# Patient Record
Sex: FEMALE | Race: OTHER | HISPANIC OR LATINO | URBAN - METROPOLITAN AREA
[De-identification: names, ages, dates, MRNs, and addresses within clinical notes are randomized per-mention and may not be internally consistent; named-entity substitution may affect disease eponyms.]

---

## 2023-07-25 ENCOUNTER — EMERGENCY (EMERGENCY)
Facility: HOSPITAL | Age: 73
LOS: 1 days | Discharge: HOME | End: 2023-07-25
Attending: EMERGENCY MEDICINE
Payer: MEDICARE

## 2023-07-25 VITALS
TEMPERATURE: 99 F | OXYGEN SATURATION: 99 % | HEART RATE: 88 BPM | DIASTOLIC BLOOD PRESSURE: 84 MMHG | RESPIRATION RATE: 17 BRPM | WEIGHT: 143.3 LBS | SYSTOLIC BLOOD PRESSURE: 144 MMHG

## 2023-07-25 DIAGNOSIS — Z90.49 ACQUIRED ABSENCE OF OTHER SPECIFIED PARTS OF DIGESTIVE TRACT: Chronic | ICD-10-CM

## 2023-07-25 LAB
ALBUMIN SERPL ELPH-MCNC: 3.6 G/DL — SIGNIFICANT CHANGE UP (ref 3.5–5)
ALP SERPL-CCNC: 101 U/L — SIGNIFICANT CHANGE UP (ref 40–120)
ALT FLD-CCNC: 28 U/L DA — SIGNIFICANT CHANGE UP (ref 10–60)
ANION GAP SERPL CALC-SCNC: 6 MMOL/L — SIGNIFICANT CHANGE UP (ref 5–17)
APPEARANCE UR: ABNORMAL
AST SERPL-CCNC: 18 U/L — SIGNIFICANT CHANGE UP (ref 10–40)
BACTERIA # UR AUTO: ABNORMAL /HPF
BASOPHILS # BLD AUTO: 0.05 K/UL — SIGNIFICANT CHANGE UP (ref 0–0.2)
BASOPHILS NFR BLD AUTO: 0.3 % — SIGNIFICANT CHANGE UP (ref 0–2)
BILIRUB SERPL-MCNC: 0.6 MG/DL — SIGNIFICANT CHANGE UP (ref 0.2–1.2)
BILIRUB UR-MCNC: NEGATIVE — SIGNIFICANT CHANGE UP
BUN SERPL-MCNC: 17 MG/DL — SIGNIFICANT CHANGE UP (ref 7–18)
CALCIUM SERPL-MCNC: 9 MG/DL — SIGNIFICANT CHANGE UP (ref 8.4–10.5)
CHLORIDE SERPL-SCNC: 106 MMOL/L — SIGNIFICANT CHANGE UP (ref 96–108)
CO2 SERPL-SCNC: 26 MMOL/L — SIGNIFICANT CHANGE UP (ref 22–31)
COLOR SPEC: SIGNIFICANT CHANGE UP
CREAT SERPL-MCNC: 1.03 MG/DL — SIGNIFICANT CHANGE UP (ref 0.5–1.3)
DIFF PNL FLD: ABNORMAL
EGFR: 58 ML/MIN/1.73M2 — LOW
EOSINOPHIL # BLD AUTO: 0.02 K/UL — SIGNIFICANT CHANGE UP (ref 0–0.5)
EOSINOPHIL NFR BLD AUTO: 0.1 % — SIGNIFICANT CHANGE UP (ref 0–6)
EPI CELLS # UR: ABNORMAL /HPF
GLUCOSE SERPL-MCNC: 119 MG/DL — HIGH (ref 70–99)
GLUCOSE UR QL: NEGATIVE — SIGNIFICANT CHANGE UP
HCT VFR BLD CALC: 43 % — SIGNIFICANT CHANGE UP (ref 34.5–45)
HGB BLD-MCNC: 14.2 G/DL — SIGNIFICANT CHANGE UP (ref 11.5–15.5)
HIV 1 & 2 AB SERPL IA.RAPID: SIGNIFICANT CHANGE UP
IMM GRANULOCYTES NFR BLD AUTO: 0.9 % — SIGNIFICANT CHANGE UP (ref 0–0.9)
KETONES UR-MCNC: NEGATIVE — SIGNIFICANT CHANGE UP
LACTATE SERPL-SCNC: 1.5 MMOL/L — SIGNIFICANT CHANGE UP (ref 0.7–2)
LACTATE SERPL-SCNC: 2.1 MMOL/L — HIGH (ref 0.7–2)
LEUKOCYTE ESTERASE UR-ACNC: ABNORMAL
LIDOCAIN IGE QN: 80 U/L — SIGNIFICANT CHANGE UP (ref 73–393)
LYMPHOCYTES # BLD AUTO: 0.8 K/UL — LOW (ref 1–3.3)
LYMPHOCYTES # BLD AUTO: 4.5 % — LOW (ref 13–44)
MAGNESIUM SERPL-MCNC: 2 MG/DL — SIGNIFICANT CHANGE UP (ref 1.6–2.6)
MCHC RBC-ENTMCNC: 30.6 PG — SIGNIFICANT CHANGE UP (ref 27–34)
MCHC RBC-ENTMCNC: 33 GM/DL — SIGNIFICANT CHANGE UP (ref 32–36)
MCV RBC AUTO: 92.7 FL — SIGNIFICANT CHANGE UP (ref 80–100)
MONOCYTES # BLD AUTO: 1.06 K/UL — HIGH (ref 0–0.9)
MONOCYTES NFR BLD AUTO: 5.9 % — SIGNIFICANT CHANGE UP (ref 2–14)
NEUTROPHILS # BLD AUTO: 15.78 K/UL — HIGH (ref 1.8–7.4)
NEUTROPHILS NFR BLD AUTO: 88.3 % — HIGH (ref 43–77)
NITRITE UR-MCNC: NEGATIVE — SIGNIFICANT CHANGE UP
NRBC # BLD: 0 /100 WBCS — SIGNIFICANT CHANGE UP (ref 0–0)
NT-PROBNP SERPL-SCNC: 210 PG/ML — HIGH (ref 0–125)
PH UR: 6 — SIGNIFICANT CHANGE UP (ref 5–8)
PLATELET # BLD AUTO: 183 K/UL — SIGNIFICANT CHANGE UP (ref 150–400)
POTASSIUM SERPL-MCNC: 4.1 MMOL/L — SIGNIFICANT CHANGE UP (ref 3.5–5.3)
POTASSIUM SERPL-SCNC: 4.1 MMOL/L — SIGNIFICANT CHANGE UP (ref 3.5–5.3)
PROT SERPL-MCNC: 8.3 G/DL — SIGNIFICANT CHANGE UP (ref 6–8.3)
PROT UR-MCNC: 100 MG/DL
RBC # BLD: 4.64 M/UL — SIGNIFICANT CHANGE UP (ref 3.8–5.2)
RBC # FLD: 13.1 % — SIGNIFICANT CHANGE UP (ref 10.3–14.5)
RBC CASTS # UR COMP ASSIST: ABNORMAL /HPF (ref 0–2)
SODIUM SERPL-SCNC: 138 MMOL/L — SIGNIFICANT CHANGE UP (ref 135–145)
SP GR SPEC: 1.01 — SIGNIFICANT CHANGE UP (ref 1.01–1.02)
TROPONIN I, HIGH SENSITIVITY RESULT: 8.6 NG/L — SIGNIFICANT CHANGE UP
UROBILINOGEN FLD QL: NEGATIVE — SIGNIFICANT CHANGE UP
WBC # BLD: 17.87 K/UL — HIGH (ref 3.8–10.5)
WBC # FLD AUTO: 17.87 K/UL — HIGH (ref 3.8–10.5)
WBC UR QL: >50 /HPF (ref 0–5)

## 2023-07-25 PROCEDURE — 74176 CT ABD & PELVIS W/O CONTRAST: CPT | Mod: 26,MA

## 2023-07-25 PROCEDURE — 99285 EMERGENCY DEPT VISIT HI MDM: CPT

## 2023-07-25 PROCEDURE — 71045 X-RAY EXAM CHEST 1 VIEW: CPT | Mod: 26

## 2023-07-25 RX ORDER — SODIUM CHLORIDE 9 MG/ML
1000 INJECTION INTRAMUSCULAR; INTRAVENOUS; SUBCUTANEOUS ONCE
Refills: 0 | Status: COMPLETED | OUTPATIENT
Start: 2023-07-25 | End: 2023-07-25

## 2023-07-25 RX ORDER — CEFUROXIME AXETIL 250 MG
1 TABLET ORAL
Qty: 20 | Refills: 0
Start: 2023-07-25 | End: 2023-08-03

## 2023-07-25 RX ORDER — IBUPROFEN 200 MG
1 TABLET ORAL
Qty: 15 | Refills: 0
Start: 2023-07-25 | End: 2023-07-29

## 2023-07-25 RX ORDER — SODIUM CHLORIDE 9 MG/ML
1000 INJECTION INTRAMUSCULAR; INTRAVENOUS; SUBCUTANEOUS
Refills: 0 | Status: DISCONTINUED | OUTPATIENT
Start: 2023-07-25 | End: 2023-07-29

## 2023-07-25 RX ORDER — CEFTRIAXONE 500 MG/1
1000 INJECTION, POWDER, FOR SOLUTION INTRAMUSCULAR; INTRAVENOUS ONCE
Refills: 0 | Status: COMPLETED | OUTPATIENT
Start: 2023-07-25 | End: 2023-07-25

## 2023-07-25 RX ORDER — KETOROLAC TROMETHAMINE 30 MG/ML
30 SYRINGE (ML) INJECTION ONCE
Refills: 0 | Status: DISCONTINUED | OUTPATIENT
Start: 2023-07-25 | End: 2023-07-25

## 2023-07-25 RX ADMIN — SODIUM CHLORIDE 1000 MILLILITER(S): 9 INJECTION INTRAMUSCULAR; INTRAVENOUS; SUBCUTANEOUS at 18:50

## 2023-07-25 RX ADMIN — Medication 30 MILLIGRAM(S): at 22:12

## 2023-07-25 RX ADMIN — SODIUM CHLORIDE 150 MILLILITER(S): 9 INJECTION INTRAMUSCULAR; INTRAVENOUS; SUBCUTANEOUS at 18:56

## 2023-07-25 RX ADMIN — CEFTRIAXONE 100 MILLIGRAM(S): 500 INJECTION, POWDER, FOR SOLUTION INTRAMUSCULAR; INTRAVENOUS at 18:56

## 2023-07-25 RX ADMIN — Medication 30 MILLIGRAM(S): at 22:35

## 2023-07-25 RX ADMIN — CEFTRIAXONE 1000 MILLIGRAM(S): 500 INJECTION, POWDER, FOR SOLUTION INTRAMUSCULAR; INTRAVENOUS at 19:18

## 2023-07-25 RX ADMIN — SODIUM CHLORIDE 1000 MILLILITER(S): 9 INJECTION INTRAMUSCULAR; INTRAVENOUS; SUBCUTANEOUS at 19:18

## 2023-07-25 NOTE — ED PROVIDER NOTE - NSFOLLOWUPINSTRUCTIONS_ED_ALL_ED_FT
Pielonefritis en los adultos  Pyelonephritis, Adult  Body outline showing the urinary system, with a close-up of a normal kidney and an infected kidney.  La pielonefritis es america infección que se produce en el riñón. Los riñones son los órganos que filtran la benjamin y eliminan los residuos del torrente sanguíneo a través de la orina. La orina pasa desde los riñones, a través de tubos llamados uréteres, hacia la vejiga. Hay dos tipos principales de pielonefritis:  Infecciones que se inician rápidamente sin síntomas previos (pielonefritis aguda).  Infecciones que persisten main un período prolongado (pielonefritis crónica).  En la mayoría de los casos, la infección desaparece con el tratamiento y no causa otros problemas. Las infecciones más graves o crónicas a veces pueden propagarse al torrente sanguíneo u ocasionar otros problemas en los riñones.    ¿Cuáles son las causas?  Por lo general, entre las causas de esta afección, se incluyen las siguientes:  Bacterias que viajan desde la vejiga al riñón. [place] puede ocurrir después de katharina tenido america infección en la vejiga (cistitis) o america infección urinaria (IU).  Infecciones de la vejiga causadas por bacterias que viajan desde el torrente sanguíneo hasta el riñón.  ¿Qué incrementa el riesgo?  Es más probable que esta afección se manifieste en:  Mujeres embarazadas.  Personas de edad avanzada.  Personas que tienen alguna de estas afecciones:  Diabetes.  Inflamación de la próstata (prostatitis) en los hombres.  Cálculos renales o en la vejiga.  Otras anormalidades del riñón o de la uretra.  Cáncer.  Las personas que tienen america sonda vesical.  Las personas que son sexualmente activas.  Las mujeres que usan espermicidas.  Las personas que tuvieron america IU previa.  ¿Cuáles son los signos o los síntomas?  Los síntomas de esta afección incluyen:  Ganas frecuentes de orinar.  Necesidad intensa o persistente de orinar.  Sensación de ardor o escozor al orinar.  Dolor abdominal.  Dolor de espalda.  Dolor al costado del cuerpo o en la fosa lumbar.  Fiebre o escalofríos.  Benjamin en la orina u orina de color oscuro.  Náuseas o vómitos.  ¿Cómo se diagnostica?  Esta afección se puede diagnosticar en función de lo siguiente:  Los antecedentes médicos y un examen físico.  Análisis de orina.  Análisis de benjamin.  También pueden hacerle estudios de diagnóstico por imágenes de los riñones, por ejemplo, america ecografía o america exploración por tomografía computarizada (TC).    ¿Cómo se trata?  El tratamiento de esta afección puede depender de la gravedad de la infección.  Si la infección es leve y se detecta rápidamente, pueden administrarle antibióticos por boca (vía oral). Deberá maximo líquido para permanecer hidratado.  Si la infección es más grave, es posible que deban hospitalizarlo para administrarle antibióticos directamente en america vena a través de america vía intravenosa (IV). Quizás también deban administrarle líquidos a través de america vía intravenosa si no puede permanecer gideon hidratado. Después de la hospitalización, es posible que deba maximo antibióticos main un tiempo.  Podrán prescribirle otros tratamientos según la causa de la infección.    Siga estas instrucciones en corona casa:  Medicamentos    [place] corona antibiótico grace se lo haya indicado el médico. No deje de maximo el antibiótico aunque comience a sentirse mejor.  Use los medicamentos de venta sterling y los recetados solamente grace se lo haya indicado el médico.  Instrucciones generales    Three cups showing dark yellow, yellow, and pale yellow urine.  Sugey suficiente líquido grace para mantener la orina de color amarillo pálido.  Evite la cafeína, el té y las bebidas gaseosas. Estas sustancias irritan la vejiga.  Orine con frecuencia. Evite retener la orina main largos períodos.  Orine antes y después de las relaciones sexuales.  Después de las deposiciones, las mujeres deben higienizarse desde adelante hacia atrás. Use cada papel solamente america vez.  Concurra a todas las visitas de seguimiento grace se lo haya indicado el médico. [place] es importante.  Comuníquese con un médico si:  Los síntomas no mejoran después de 2 hermann de tratamiento.  Martha síntomas empeoran.  Tiene fiebre.  Solicite ayuda de inmediato si:  No puede maximo los antibióticos ni ingerir líquidos.  Tiene escalofríos.  Vomita.  Siente un dolor intenso en la espalda o en la fosa lumbar.  Se desmaya o siente america debilidad extrema.  Resumen  La pielonefritis es america infección urinaria (IU) que se produce en el riñón.  El tratamiento de esta afección puede depender de la gravedad de la infección.  [place] corona antibiótico grace se lo haya indicado el médico. No deje de maximo el antibiótico aunque comience a sentirse mejor.  Sugey suficiente líquido grace para mantener la orina de color amarillo pálido.  Concurra a todas las visitas de seguimiento grace se lo haya indicado el médico. [place] es importante.  Esta información no tiene grace fin reemplazar el consejo del médico. Asegúrese de hacerle al médico cualquier pregunta que tenga.      drink plenty of fluids  take Probiotics 3 hours after taking antibiotics  if no improvement, return to ED  follow up with your medical doctor

## 2023-07-25 NOTE — ED PROVIDER NOTE - CCCP TRG CHIEF CMPLNT
PROCEDURES:  Left total knee arthroplasty 06-Jun-2022 18:26:30  Kishore Candelaria   fever with chills/flank pain

## 2023-07-25 NOTE — ED PROVIDER NOTE - PRO INTERPRETER NEED 2
From: Bandar Israel  To: Eileen Pendleton  Sent: 4/13/2023 10:20 AM CDT  Subject: Doctors note    Hi, I was wondering if you could give me a doctors note that excuses me for the entire day today and return to work tomorrow.     Thanks Bandar    [place]

## 2023-07-25 NOTE — ED PROVIDER NOTE - CLINICAL SUMMARY MEDICAL DECISION MAKING FREE TEXT BOX
pt with urinary symptoms, concern for pyelo., unlikely renal colic, will get labs., CT, give Abx, IVF, reassess

## 2023-07-25 NOTE — ED PROVIDER NOTE - OBJECTIVE STATEMENT
72 y.o. female c/o on & off Rt flank pain for past 5 days, dysuria, urinary frequency, fever, rigors, nausea, no vomiting, Pt took tylenol 1 tab ~4pm. Pt feeling a little hungry.  Last BM this am, no BRBPR

## 2023-07-25 NOTE — ED PROVIDER NOTE - PROGRESS NOTE DETAILS
Labs/CT explained to pt &   Pt with acute pyelo., elevated lactate, leukocytosis.  Pt states feeling much better, wants to go home.  Will repeat lactate & CBC repeat lactate 1.5, pt's feeling better, still wants to go home.   Will d/c home with ceftin.  advised if no improvement, to return to ED, also to f/u with PMD

## 2023-07-25 NOTE — ED PROVIDER NOTE - NEUROLOGICAL, MLM
What Type Of Note Output Would You Prefer (Optional)?: Bullet Format
Hpi Title: Evaluation of Skin Lesions
Additional History: New. FBSE. Spot on breast that’s growing. Hx of dn
Alert and oriented, no focal deficits, no motor or sensory deficits.

## 2023-07-25 NOTE — ED ADULT NURSE NOTE - OBJECTIVE STATEMENT
Pt presents to the ED complaining of bilateral flank pain, burning with urinating, fever and chills. Streaming as normal. Denies blood in urine. Pt A&O x 3 in no acute distress. Labs drawn. Awaiting imaging.

## 2023-07-25 NOTE — ED PROVIDER NOTE - PATIENT PORTAL LINK FT
You can access the FollowMyHealth Patient Portal offered by [facility] by registering at the following website: [email or web address]. By joining [employer]’s FollowMyHealth portal, you will also be able to view your health information using other applications (apps) compatible with our system.

## 2023-07-25 NOTE — ED ADULT NURSE NOTE - NSFALLUNIVINTERV_ED_ALL_ED
Bed/Stretcher in lowest position, wheels locked, appropriate side rails in place/Call bell, personal items and telephone in reach/Instruct patient to call for assistance before getting out of bed/chair/stretcher/Non-slip footwear applied when patient is off stretcher/[place] to call system/Physically safe environment - no spills, clutter or unnecessary equipment/Purposeful proactive rounding/Room/bathroom lighting operational, light cord in reach

## 2023-07-26 VITALS
TEMPERATURE: 98 F | RESPIRATION RATE: 18 BRPM | DIASTOLIC BLOOD PRESSURE: 76 MMHG | OXYGEN SATURATION: 96 % | HEART RATE: 60 BPM | SYSTOLIC BLOOD PRESSURE: 127 MMHG

## 2023-07-26 LAB
E COLI DNA BLD POS QL NAA+NON-PROBE: SIGNIFICANT CHANGE UP
GRAM STN FLD: SIGNIFICANT CHANGE UP
GRAM STN FLD: SIGNIFICANT CHANGE UP
METHOD TYPE: SIGNIFICANT CHANGE UP
SPECIMEN SOURCE: SIGNIFICANT CHANGE UP
SPECIMEN SOURCE: SIGNIFICANT CHANGE UP

## 2023-07-26 NOTE — ED POST DISCHARGE NOTE - DETAILS
Called listed phone number and left message for call back.  Called phone number for emergency contact and told wrong number. Called listed phone number and left message for call back. Called listed phone number and left message for call back.  Three calls attempted day of culture result. Will return to hospital for further care

## 2023-07-27 ENCOUNTER — INPATIENT (INPATIENT)
Facility: HOSPITAL | Age: 73
LOS: 2 days | Discharge: HOME | DRG: 872 | End: 2023-07-30
Attending: INTERNAL MEDICINE | Admitting: INTERNAL MEDICINE
Payer: MEDICARE

## 2023-07-27 VITALS
HEART RATE: 71 BPM | DIASTOLIC BLOOD PRESSURE: 84 MMHG | OXYGEN SATURATION: 96 % | SYSTOLIC BLOOD PRESSURE: 127 MMHG | RESPIRATION RATE: 16 BRPM | WEIGHT: 134.92 LBS | TEMPERATURE: 98 F

## 2023-07-27 DIAGNOSIS — N12 TUBULO-INTERSTITIAL NEPHRITIS, NOT SPECIFIED AS ACUTE OR CHRONIC: ICD-10-CM

## 2023-07-27 DIAGNOSIS — R78.81 BACTEREMIA: ICD-10-CM

## 2023-07-27 DIAGNOSIS — I10 ESSENTIAL (PRIMARY) HYPERTENSION: ICD-10-CM

## 2023-07-27 DIAGNOSIS — A41.9 SEPSIS, UNSPECIFIED ORGANISM: ICD-10-CM

## 2023-07-27 DIAGNOSIS — E03.9 HYPOTHYROIDISM, UNSPECIFIED: ICD-10-CM

## 2023-07-27 DIAGNOSIS — Z90.49 ACQUIRED ABSENCE OF OTHER SPECIFIED PARTS OF DIGESTIVE TRACT: Chronic | ICD-10-CM

## 2023-07-27 DIAGNOSIS — Z29.9 ENCOUNTER FOR PROPHYLACTIC MEASURES, UNSPECIFIED: ICD-10-CM

## 2023-07-27 LAB
ALBUMIN SERPL ELPH-MCNC: 3.2 G/DL — LOW (ref 3.5–5)
ALP SERPL-CCNC: 88 U/L — SIGNIFICANT CHANGE UP (ref 40–120)
ALT FLD-CCNC: 34 U/L DA — SIGNIFICANT CHANGE UP (ref 10–60)
ANION GAP SERPL CALC-SCNC: 6 MMOL/L — SIGNIFICANT CHANGE UP (ref 5–17)
APPEARANCE UR: CLEAR — SIGNIFICANT CHANGE UP
AST SERPL-CCNC: 20 U/L — SIGNIFICANT CHANGE UP (ref 10–40)
BASOPHILS # BLD AUTO: 0.01 K/UL — SIGNIFICANT CHANGE UP (ref 0–0.2)
BASOPHILS NFR BLD AUTO: 0.1 % — SIGNIFICANT CHANGE UP (ref 0–2)
BILIRUB SERPL-MCNC: 0.3 MG/DL — SIGNIFICANT CHANGE UP (ref 0.2–1.2)
BILIRUB UR-MCNC: NEGATIVE — SIGNIFICANT CHANGE UP
BUN SERPL-MCNC: 11 MG/DL — SIGNIFICANT CHANGE UP (ref 7–18)
CALCIUM SERPL-MCNC: 9.1 MG/DL — SIGNIFICANT CHANGE UP (ref 8.4–10.5)
CHLORIDE SERPL-SCNC: 109 MMOL/L — HIGH (ref 96–108)
CO2 SERPL-SCNC: 26 MMOL/L — SIGNIFICANT CHANGE UP (ref 22–31)
COLOR SPEC: YELLOW — SIGNIFICANT CHANGE UP
CREAT SERPL-MCNC: 0.88 MG/DL — SIGNIFICANT CHANGE UP (ref 0.5–1.3)
DIFF PNL FLD: ABNORMAL
EGFR: 70 ML/MIN/1.73M2 — SIGNIFICANT CHANGE UP
EOSINOPHIL # BLD AUTO: 0.04 K/UL — SIGNIFICANT CHANGE UP (ref 0–0.5)
EOSINOPHIL NFR BLD AUTO: 0.5 % — SIGNIFICANT CHANGE UP (ref 0–6)
GLUCOSE SERPL-MCNC: 100 MG/DL — HIGH (ref 70–99)
GLUCOSE UR QL: NEGATIVE — SIGNIFICANT CHANGE UP
GRAM STN FLD: SIGNIFICANT CHANGE UP
HCT VFR BLD CALC: 39.5 % — SIGNIFICANT CHANGE UP (ref 34.5–45)
HGB BLD-MCNC: 13 G/DL — SIGNIFICANT CHANGE UP (ref 11.5–15.5)
IMM GRANULOCYTES NFR BLD AUTO: 0.5 % — SIGNIFICANT CHANGE UP (ref 0–0.9)
KETONES UR-MCNC: ABNORMAL
LACTATE SERPL-SCNC: 1.1 MMOL/L — SIGNIFICANT CHANGE UP (ref 0.7–2)
LEUKOCYTE ESTERASE UR-ACNC: ABNORMAL
LYMPHOCYTES # BLD AUTO: 0.91 K/UL — LOW (ref 1–3.3)
LYMPHOCYTES # BLD AUTO: 12.1 % — LOW (ref 13–44)
MCHC RBC-ENTMCNC: 30 PG — SIGNIFICANT CHANGE UP (ref 27–34)
MCHC RBC-ENTMCNC: 32.9 GM/DL — SIGNIFICANT CHANGE UP (ref 32–36)
MCV RBC AUTO: 91.2 FL — SIGNIFICANT CHANGE UP (ref 80–100)
MONOCYTES # BLD AUTO: 0.58 K/UL — SIGNIFICANT CHANGE UP (ref 0–0.9)
MONOCYTES NFR BLD AUTO: 7.7 % — SIGNIFICANT CHANGE UP (ref 2–14)
NEUTROPHILS # BLD AUTO: 5.92 K/UL — SIGNIFICANT CHANGE UP (ref 1.8–7.4)
NEUTROPHILS NFR BLD AUTO: 79.1 % — HIGH (ref 43–77)
NITRITE UR-MCNC: NEGATIVE — SIGNIFICANT CHANGE UP
NRBC # BLD: 0 /100 WBCS — SIGNIFICANT CHANGE UP (ref 0–0)
PH UR: 6.5 — SIGNIFICANT CHANGE UP (ref 5–8)
PLATELET # BLD AUTO: 172 K/UL — SIGNIFICANT CHANGE UP (ref 150–400)
POTASSIUM SERPL-MCNC: 4.3 MMOL/L — SIGNIFICANT CHANGE UP (ref 3.5–5.3)
POTASSIUM SERPL-SCNC: 4.3 MMOL/L — SIGNIFICANT CHANGE UP (ref 3.5–5.3)
PROT SERPL-MCNC: 7.5 G/DL — SIGNIFICANT CHANGE UP (ref 6–8.3)
PROT UR-MCNC: 30 MG/DL
RBC # BLD: 4.33 M/UL — SIGNIFICANT CHANGE UP (ref 3.8–5.2)
RBC # FLD: 13.2 % — SIGNIFICANT CHANGE UP (ref 10.3–14.5)
SODIUM SERPL-SCNC: 141 MMOL/L — SIGNIFICANT CHANGE UP (ref 135–145)
SP GR SPEC: 1.01 — SIGNIFICANT CHANGE UP (ref 1.01–1.02)
UROBILINOGEN FLD QL: NEGATIVE — SIGNIFICANT CHANGE UP
WBC # BLD: 7.5 K/UL — SIGNIFICANT CHANGE UP (ref 3.8–10.5)
WBC # FLD AUTO: 7.5 K/UL — SIGNIFICANT CHANGE UP (ref 3.8–10.5)

## 2023-07-27 PROCEDURE — 99285 EMERGENCY DEPT VISIT HI MDM: CPT

## 2023-07-27 RX ORDER — ACETAMINOPHEN 500 MG
650 TABLET ORAL EVERY 6 HOURS
Refills: 0 | Status: DISCONTINUED | OUTPATIENT
Start: 2023-07-27 | End: 2023-07-30

## 2023-07-27 RX ORDER — LEVOTHYROXINE SODIUM 125 MCG
75 TABLET ORAL DAILY
Refills: 0 | Status: DISCONTINUED | OUTPATIENT
Start: 2023-07-27 | End: 2023-07-30

## 2023-07-27 RX ORDER — LEVOTHYROXINE SODIUM 125 MCG
1 TABLET ORAL
Refills: 0 | DISCHARGE

## 2023-07-27 RX ORDER — CEFTRIAXONE 500 MG/1
1000 INJECTION, POWDER, FOR SOLUTION INTRAMUSCULAR; INTRAVENOUS ONCE
Refills: 0 | Status: COMPLETED | OUTPATIENT
Start: 2023-07-27 | End: 2023-07-27

## 2023-07-27 RX ORDER — ENOXAPARIN SODIUM 100 MG/ML
40 INJECTION SUBCUTANEOUS EVERY 24 HOURS
Refills: 0 | Status: DISCONTINUED | OUTPATIENT
Start: 2023-07-27 | End: 2023-07-30

## 2023-07-27 RX ORDER — ATENOLOL 25 MG/1
50 TABLET ORAL DAILY
Refills: 0 | Status: DISCONTINUED | OUTPATIENT
Start: 2023-07-27 | End: 2023-07-30

## 2023-07-27 RX ORDER — CEFTRIAXONE 500 MG/1
2000 INJECTION, POWDER, FOR SOLUTION INTRAMUSCULAR; INTRAVENOUS EVERY 24 HOURS
Refills: 0 | Status: DISCONTINUED | OUTPATIENT
Start: 2023-07-28 | End: 2023-07-30

## 2023-07-27 RX ORDER — LEVOTHYROXINE SODIUM 125 MCG
50 TABLET ORAL DAILY
Refills: 0 | Status: DISCONTINUED | OUTPATIENT
Start: 2023-07-27 | End: 2023-07-27

## 2023-07-27 RX ADMIN — CEFTRIAXONE 100 MILLIGRAM(S): 500 INJECTION, POWDER, FOR SOLUTION INTRAMUSCULAR; INTRAVENOUS at 10:52

## 2023-07-27 RX ADMIN — CEFTRIAXONE 1000 MILLIGRAM(S): 500 INJECTION, POWDER, FOR SOLUTION INTRAMUSCULAR; INTRAVENOUS at 11:20

## 2023-07-27 RX ADMIN — CEFTRIAXONE 1000 MILLIGRAM(S): 500 INJECTION, POWDER, FOR SOLUTION INTRAMUSCULAR; INTRAVENOUS at 12:15

## 2023-07-27 RX ADMIN — CEFTRIAXONE 100 MILLIGRAM(S): 500 INJECTION, POWDER, FOR SOLUTION INTRAMUSCULAR; INTRAVENOUS at 11:47

## 2023-07-27 RX ADMIN — Medication 650 MILLIGRAM(S): at 20:10

## 2023-07-27 RX ADMIN — Medication 650 MILLIGRAM(S): at 19:39

## 2023-07-27 NOTE — ED PROVIDER NOTE - OBJECTIVE STATEMENT
72 year old female PMH HTN, hypothyroid called back for admission for a +blood culture. pt taking ceftin for a uti from 7/25.

## 2023-07-27 NOTE — H&P ADULT - PROBLEM/PLAN-5
Refill request adderall  Patient last seen in the office on 2/18/2022  Last refill was 3/31/2022   routed to pcp to review     DISPLAY PLAN FREE TEXT

## 2023-07-27 NOTE — H&P ADULT - PROBLEM SELECTOR PLAN 2
- Ct a/p: Mild left perinephric and periureteral stranding. Please correlate for pyelonephritis/ureteritis. Cholelithiasis without evidence of acute cholecystitis.  - Got 2 days of therapy with cefuroxime.   - C/w ceftriaxone for 5 more days (1 week of total therapy).   - UA on this admission is positive again.   - F/U Ucx. - Ct a/p: Mild left perinephric and periureteral stranding. Please correlate for pyelonephritis/ureteritis. Cholelithiasis without evidence of acute cholecystitis.  - Got 2 days of therapy with cefuroxime.   - C/w ceftriaxone for 8 more days (10 day of total therapy)  - UA on this admission is positive again.   - F/U Ucx.

## 2023-07-27 NOTE — PHARMACOTHERAPY INTERVENTION NOTE - COMMENTS
Suggest 2 grams q24h for E. coli bacteremia    Source: [place]  PHT.431 Ceftriaxone Use in the Adult Population

## 2023-07-27 NOTE — PHARMACOTHERAPY INTERVENTION NOTE - COMMENTS
Patient was interviewed at bedside. Patient's allergy status (NKDA) and home medications were confirmed with the patient. Outpatient Medication Review was updated.

## 2023-07-27 NOTE — ED PROVIDER NOTE - IV ALTEPLASE EXCL ABS HIDDEN
show

## 2023-07-27 NOTE — H&P ADULT - ATTENDING COMMENTS
73 y/o F with PMH HTN and hypothyroidism who was called back to the ED today for positive blood cultures. Patient came to the ED on 07/25/2023 with complains of fevers/chills, right flank pain, dysuria, increased urinary frequency and urgency. Patient tested positive for urinary tract infection and was discharged with cefuroxime 250mg BID.    T(C): 39.4 (07-27-23 @ 19:37), Max: 39.4 (07-27-23 @ 19:37)  HR: 85 (07-27-23 @ 19:37) (68 - 85)  BP: 129/77 (07-27-23 @ 19:37) (127/61 - 129/77)  RR: 18 (07-27-23 @ 19:37) (18 - 19)  SpO2: 95% (07-27-23 @ 19:37) (95% - 98%)    Reviewed labs and imaging     E coli Bacteremia   Acute Pyelonephritis   Hypothyroidism   HTN     iv abx   Follow up specification

## 2023-07-27 NOTE — ED ADULT NURSE NOTE - ED STAT RN HANDOFF DETAILS
Pt resting in bed, no acute respiratory distress noted. awaiting for inpatient bed assignment. Endorses to [place] for continuation of care.

## 2023-07-27 NOTE — H&P ADULT - PROBLEM SELECTOR PLAN 1
- Called back to ED for positive blood cultures.   - Bcx - E. Coli.   - Does not meet SIRS/sepsis criteria on admission.   - UA+ve on this admission again.   - Likely 2/2 to UTI.   - Got 2 days of therapy with cefuroxime.   - S/p Ceftriaxone in ED.   - C/w ceftriaxone for 5 more days (1 week of total therapy)  - F/U repeat Bcx.   - F/U Ucx. - Called back to ED for positive blood cultures.   - Bcx - E. Coli.   - Does not meet SIRS/sepsis criteria on admission.   - UA+ve on this admission again.   - Likely 2/2 to UTI.   - Got 2 days of therapy with cefuroxime.   - S/p Ceftriaxone in ED.   - C/w ceftriaxone for 8 more days (10 day of total therapy)  - F/U repeat Bcx.   - F/U Ucx.  - Consulted ID - Dr Negron

## 2023-07-27 NOTE — ED ADULT NURSE NOTE - NSFALLUNIVINTERV_ED_ALL_ED
Bed/Stretcher in lowest position, wheels locked, appropriate side rails in place/Call bell, personal items and telephone in reach/Instruct patient to call for assistance before getting out of bed/chair/stretcher/Non-slip footwear applied when patient is off stretcher/[place] to call system/Physically safe environment - no spills, clutter or unnecessary equipment/Purposeful proactive rounding/Room/bathroom lighting operational, light cord in reach

## 2023-07-27 NOTE — H&P ADULT - PROBLEM SELECTOR PLAN 4
C/w Levothyroxine 75mg.   Patient reports she takes 50mg Levothyroxine but pharmacy reported 75mg levothyroxine.   F/U TSH

## 2023-07-27 NOTE — H&P ADULT - ASSESSMENT
Patient is a 73 y/o F with PMH HTN and hypothyroidism who was called back to the ED today for positive blood cultures. Patient came to the ED on 07/25/2023 with complains of fevers/chills, right flank pain, dysuria, increased urinary frequency and urgency. Patient is being admitted for further management of septicemia 2/2 UTI/pyelonephritis.

## 2023-07-27 NOTE — ED ADULT NURSE NOTE - ED STAT RN HANDOFF DETAILS 2
Pt is alert and oriented x3. Pt was febrile. MAR made aware. Pt medicated per order. Report given. No distress noted. Safety maintained. Pt is transferred to  in Stable condition.

## 2023-07-27 NOTE — PHARMACOTHERAPY INTERVENTION NOTE - NSPHARMCOMMASP
[facility]  [email or web address]  [phone]    Skyler Rios MD, [address] [facility]       Annual Gynecologic Exam  WWE   Chief Complaint   Patient presents with    Well Woman    Mammogram       Dontrell Ortega is a 41 y.o.    female who presents for an annual exam.  Patient's last menstrual period was 2021 (exact date). .    Patient has the following concerns today: none. Menstrual status:  She does not report dysmenorrhea/painful menses. She does not report heavy menses. She does not report irregular bleeding. Sexual history and Contraception:  Social History     Substance and Sexual Activity   Sexual Activity Yes    Partners: Male    Birth control/protection: None       She does not reports new sexual partner(s) in the last year. Preventive Medicine History:  Her most recent Pap smear result: normal was obtained in 2020    Her most recent HR HPV screen was Negative obtained in     She does not have a history of NOEMY 2, 3 or cervical cancer. Breast health:  [facility] Results (most recent):  Results from [place] encounter on 21    [facility] 3D ARLEY W MAMMO BI SCREENING INCL CAD    Narrative  STUDY:  Bilateral Digital Screening 3D breast tomosynthesis is performed and  interpreted in conjunction with CAD. INDICATION:  Screening. Direct comparison is made to multiple prior mammograms. BREAST COMPOSITION: The breast parenchyma is heterogeneously dense. FINDINGS: The breast parenchyma is stable bilaterally. No suspicious masses or  calcifications are identified. There is no skin thickening or nipple retraction. There has been no significant change. Impression  BI-RADS 2. Benign. No mammographic evidence of malignancy. Next screening mammogram is recommended in one year. The patient will be  notified of these results. Last mammogram: approximate date 2020 and was normal.   Breast cancer family updated: see FH. ASP - Dose optimization/Non-Renal dose adjustment Past Medical History:   Diagnosis Date    H/O mammogram 2018; 1/10/19; 2020    Negative (dense); Normal; negative birads 2 dense    HX OTHER MEDICAL     hpv positive    HX OTHER MEDICAL     fibroids 5.7 cm, 4.8 cm    Pap smear for cervical cancer screening 2017    neg hpv neg    Pap smear for cervical cancer screening 2020    Pap- Negative, HPV- Negative     OB History    Para Term  AB Living   1 1 1     1   SAB TAB Ectopic Molar Multiple Live Births             1      # Outcome Date GA Lbr Wing/2nd Weight Sex Delivery Anes PTL Lv   1 Term 07/10/12 41w5d  10 lb 13.2 oz (4.91 kg) M  LO SPINAL AN N DANITA       Past Surgical History:   Procedure Laterality Date    HX OTHER SURGICAL      hysterosalpingogram     Family History   Problem Relation Age of Onset    Hypertension Mother      Social History     Socioeconomic History    Marital status:      Spouse name: Not on file    Number of children: Not on file    Years of education: Not on file    Highest education level: Not on file   Occupational History    Not on file   Tobacco Use    Smoking status: Never Smoker    Smokeless tobacco: Never Used   Vaping Use    Vaping Use: Never used   Substance and Sexual Activity    Alcohol use: No    Drug use: Never    Sexual activity: Yes     Partners: Male     Birth control/protection: None   Other Topics Concern    Not on file   Social History Narrative    Not on file     Social Determinants of Health     Financial Resource Strain:     Difficulty of Paying Living Expenses:    Food Insecurity:     Worried About Running Out of Food in the Last Year:     Ran Out of Food in the Last Year:    Transportation Needs:     Lack of Transportation (Medical):      Lack of Transportation (Non-Medical):    Physical Activity:     Days of Exercise per Week:     Minutes of Exercise per Session:    Stress:     Feeling of Stress :    Social Connections:     Frequency of Communication with Friends and Family:     Frequency of Social Gatherings with Friends and Family:     Attends Confucianist Services:     Active Member of Clubs or Organizations:     Attends Club or Organization Meetings:     Marital Status:    Intimate Partner Violence:     Fear of Current or Ex-Partner:     Emotionally Abused:     Physically Abused:     Sexually Abused: Allergies   Allergen Reactions    Penicillin G Unknown (comments)     Allergy test performed    Sulfa (Sulfonamide Antibiotics) Other (comments)     Blood in urine       Current Outpatient Medications   Medication Sig    docosahexanoic acid-eicosapent 120-180 mg cap Take  by mouth daily.  diphenhydrAMINE (Allergy) 25 mg tablet Take 25 mg by mouth every six (6) hours as needed.  CALCIUM PO Take  by mouth.  multivitamin (ONE A DAY) tablet Take 1 tablet by mouth daily. No current facility-administered medications for this visit.        Patient Active Problem List   Diagnosis Code    Myoma D21.9       Review of Systems - History obtained from the patient  Constitutional/general, HEENT, CV, Resp, GI, MSK, Neuro, Psych, Heme/lymph, Skin, Breast ROS: no significant complaints except as noted on HPI     Physical Exam  Visit Vitals  /80 (BP 1 Location: Right upper arm, BP Patient Position: Sitting, BP Cuff Size: Adult)   Ht 5' 6\" (1.676 m)   Wt 143 lb 3.2 oz (65 kg)   LMP 05/05/2021 (Exact Date)   BMI 23.11 kg/m²       Constitutional  · Appearance: well-nourished, well developed, alert, in no acute distress    HENT  · Head and Face: appears normal    Neck  · Inspection/Palpation: normal appearance, no masses or tenderness  · Lymph Nodes: no lymphadenopathy present  · Thyroid: gland size normal, nontender, no nodules or masses present on palpation    Chest  · Respiratory Effort: breathing unlabored  · Auscultation: normal breath sounds    Cardiovascular  · Heart:  · Auscultation: regular rate and rhythm without murmur    Breasts  · Inspection of Breasts: breasts symmetrical, no skin changes, no discharge present, nipple appearance normal, no skin retraction present  · Palpation of Breasts and Axillae: no masses present on palpation, no breast tenderness  · Axillary Lymph Nodes: no lymphadenopathy present    Gastrointestinal  · Abdominal Examination: abdomen non-tender to palpation, normal bowel sounds, no masses present  · Liver and spleen: no hepatomegaly present, spleen not palpable  · Hernias: no hernias identified    Genitourinary  · External Genitalia: normal appearance for age, no discharge present, no tenderness present, no inflammatory lesions present, no masses present, without atrophy present  · Vagina: normal vaginal vault without central or paravaginal defects, no discharge present, no inflammatory lesions present, no masses present  · Bladder: non-tender to palpation  · Urethra: appears normal  · Cervix: normal   · Uterus: normal size, shape and consistency  · Adnexa: no adnexal tenderness present, no adnexal masses present  · Perineum: perineum within normal limits, no evidence of trauma, no rashes or skin lesions present  · Anus: anus within normal limits, no hemorrhoids present  · Inguinal Lymph Nodes: no lymphadenopathy present    Skin  · General Inspection: no rash, no lesions identified    Neurologic/Psychiatric  · Mental Status:  · Orientation: grossly oriented to person, place and time  · Mood and Affect: mood normal, affect appropriate    Assessment:  41 y.o.  for well woman exam  Encounter Diagnosis   Name Primary?  Encounter for gynecological examination (general) (routine) without abnormal findings Yes       Plan:  The patient was counseled about healthy lifestyle, disease prevention, and bone protection.   We discussed current self breast exam and mammogram recommendations  We discussed current pap smear and HR HPV testing guidelines  I recommended follow up in one year for routine annual gynecologic exam or sooner if needed  I recommended follow up with a primary care physician for chronic medical problems and evaluation of non-gynecologic concerns and to please contact our office with any GYN questions or concerns. Rec BP log, repeat wnl  We discussed typical perimenopausal bleeding patterns and symptoms. I recommend that she notify MD for chaotic or symptomatic bleeding, or bleeding in between menses or intermenstrual bleeding for additional evaluation. She can also schedule a consult to discuss management of symptoms of perimenopause that are concerning her or interfering with her life or day to day function or activity. Folllow up:  [x] return for annual well woman exam in one year or sooner if she is having problems  [] follow up and ultrasound  [x] mammogram  [] 6 months  [] 3 months  [] 1 month    No orders of the defined types were placed in this encounter. Results for orders placed or performed during the hospital encounter of 05/25/21   [facility] 3D [place] W MAMMO BI SCREENING INCL CAD    Narrative    STUDY:  Bilateral Digital Screening 3D breast tomosynthesis is performed and  interpreted in conjunction with CAD. INDICATION:  Screening. Direct comparison is made to multiple prior mammograms. BREAST COMPOSITION: The breast parenchyma is heterogeneously dense. FINDINGS: The breast parenchyma is stable bilaterally. No suspicious masses or  calcifications are identified. There is no skin thickening or nipple retraction. There has been no significant change. Impression    BI-RADS 2. Benign. No mammographic evidence of malignancy. Next screening mammogram is recommended in one year. The patient will be  notified of these results.

## 2023-07-27 NOTE — H&P ADULT - NSHPPHYSICALEXAM_GEN_ALL_CORE
PHYSICAL EXAM:  GENERAL: NAD, speaks in full sentences, no signs of respiratory distress  HEAD:  Atraumatic, Normocephalic  EYES: EOMI, PERRLA, conjunctiva and sclera clear  NECK: Supple  CHEST/LUNG: Clear to auscultation bilaterally; No wheeze; No crackles; No accessory muscles used  HEART: Regular rate and rhythm; No murmurs;   ABDOMEN: Soft, Nontender, Nondistended; Bowel sounds present; No guarding  GENITOURINARY: No CVA tenderness  EXTREMITIES:  2+ Peripheral Pulses, No edema  PSYCH: AAOx3  NEUROLOGY: non-focal  SKIN: No rashes or lesions

## 2023-07-28 DIAGNOSIS — A41.51 SEPSIS DUE TO ESCHERICHIA COLI [E. COLI]: ICD-10-CM

## 2023-07-28 DIAGNOSIS — K80.20 CALCULUS OF GALLBLADDER WITHOUT CHOLECYSTITIS WITHOUT OBSTRUCTION: ICD-10-CM

## 2023-07-28 DIAGNOSIS — D72.829 ELEVATED WHITE BLOOD CELL COUNT, UNSPECIFIED: ICD-10-CM

## 2023-07-28 LAB
-  AMIKACIN: SIGNIFICANT CHANGE UP
-  AMIKACIN: SIGNIFICANT CHANGE UP
-  AMOXICILLIN/CLAVULANIC ACID: SIGNIFICANT CHANGE UP
-  AMPICILLIN/SULBACTAM: SIGNIFICANT CHANGE UP
-  AMPICILLIN/SULBACTAM: SIGNIFICANT CHANGE UP
-  AMPICILLIN: SIGNIFICANT CHANGE UP
-  AMPICILLIN: SIGNIFICANT CHANGE UP
-  AZTREONAM: SIGNIFICANT CHANGE UP
-  AZTREONAM: SIGNIFICANT CHANGE UP
-  CEFAZOLIN: SIGNIFICANT CHANGE UP
-  CEFAZOLIN: SIGNIFICANT CHANGE UP
-  CEFEPIME: SIGNIFICANT CHANGE UP
-  CEFEPIME: SIGNIFICANT CHANGE UP
-  CEFOXITIN: SIGNIFICANT CHANGE UP
-  CEFOXITIN: SIGNIFICANT CHANGE UP
-  CEFTRIAXONE: SIGNIFICANT CHANGE UP
-  CEFTRIAXONE: SIGNIFICANT CHANGE UP
-  CEFUROXIME: SIGNIFICANT CHANGE UP
-  CIPROFLOXACIN: SIGNIFICANT CHANGE UP
-  CIPROFLOXACIN: SIGNIFICANT CHANGE UP
-  ERTAPENEM: SIGNIFICANT CHANGE UP
-  ERTAPENEM: SIGNIFICANT CHANGE UP
-  GENTAMICIN: SIGNIFICANT CHANGE UP
-  GENTAMICIN: SIGNIFICANT CHANGE UP
-  IMIPENEM: SIGNIFICANT CHANGE UP
-  IMIPENEM: SIGNIFICANT CHANGE UP
-  LEVOFLOXACIN: SIGNIFICANT CHANGE UP
-  LEVOFLOXACIN: SIGNIFICANT CHANGE UP
-  MEROPENEM: SIGNIFICANT CHANGE UP
-  MEROPENEM: SIGNIFICANT CHANGE UP
-  NITROFURANTOIN: SIGNIFICANT CHANGE UP
-  PIPERACILLIN/TAZOBACTAM: SIGNIFICANT CHANGE UP
-  PIPERACILLIN/TAZOBACTAM: SIGNIFICANT CHANGE UP
-  TOBRAMYCIN: SIGNIFICANT CHANGE UP
-  TOBRAMYCIN: SIGNIFICANT CHANGE UP
-  TRIMETHOPRIM/SULFAMETHOXAZOLE: SIGNIFICANT CHANGE UP
-  TRIMETHOPRIM/SULFAMETHOXAZOLE: SIGNIFICANT CHANGE UP
ANION GAP SERPL CALC-SCNC: 10 MMOL/L — SIGNIFICANT CHANGE UP (ref 5–17)
BUN SERPL-MCNC: 10 MG/DL — SIGNIFICANT CHANGE UP (ref 7–18)
CALCIUM SERPL-MCNC: 8.3 MG/DL — LOW (ref 8.4–10.5)
CHLORIDE SERPL-SCNC: 107 MMOL/L — SIGNIFICANT CHANGE UP (ref 96–108)
CO2 SERPL-SCNC: 23 MMOL/L — SIGNIFICANT CHANGE UP (ref 22–31)
CREAT SERPL-MCNC: 0.79 MG/DL — SIGNIFICANT CHANGE UP (ref 0.5–1.3)
CULTURE RESULTS: SIGNIFICANT CHANGE UP
EGFR: 80 ML/MIN/1.73M2 — SIGNIFICANT CHANGE UP
GLUCOSE SERPL-MCNC: 112 MG/DL — HIGH (ref 70–99)
HCT VFR BLD CALC: 39.8 % — SIGNIFICANT CHANGE UP (ref 34.5–45)
HCV AB S/CO SERPL IA: 0.17 S/CO — SIGNIFICANT CHANGE UP (ref 0–0.99)
HCV AB SERPL-IMP: SIGNIFICANT CHANGE UP
HGB BLD-MCNC: 13.2 G/DL — SIGNIFICANT CHANGE UP (ref 11.5–15.5)
MAGNESIUM SERPL-MCNC: 2.3 MG/DL — SIGNIFICANT CHANGE UP (ref 1.6–2.6)
MCHC RBC-ENTMCNC: 30.2 PG — SIGNIFICANT CHANGE UP (ref 27–34)
MCHC RBC-ENTMCNC: 33.2 GM/DL — SIGNIFICANT CHANGE UP (ref 32–36)
MCV RBC AUTO: 91.1 FL — SIGNIFICANT CHANGE UP (ref 80–100)
METHOD TYPE: SIGNIFICANT CHANGE UP
METHOD TYPE: SIGNIFICANT CHANGE UP
NRBC # BLD: 0 /100 WBCS — SIGNIFICANT CHANGE UP (ref 0–0)
ORGANISM # SPEC MICROSCOPIC CNT: SIGNIFICANT CHANGE UP
PHOSPHATE SERPL-MCNC: 2.6 MG/DL — SIGNIFICANT CHANGE UP (ref 2.5–4.5)
PLATELET # BLD AUTO: 171 K/UL — SIGNIFICANT CHANGE UP (ref 150–400)
POTASSIUM SERPL-MCNC: 3.6 MMOL/L — SIGNIFICANT CHANGE UP (ref 3.5–5.3)
POTASSIUM SERPL-SCNC: 3.6 MMOL/L — SIGNIFICANT CHANGE UP (ref 3.5–5.3)
RBC # BLD: 4.37 M/UL — SIGNIFICANT CHANGE UP (ref 3.8–5.2)
RBC # FLD: 12.7 % — SIGNIFICANT CHANGE UP (ref 10.3–14.5)
SODIUM SERPL-SCNC: 140 MMOL/L — SIGNIFICANT CHANGE UP (ref 135–145)
SPECIMEN SOURCE: SIGNIFICANT CHANGE UP
TSH SERPL-MCNC: 2.31 UU/ML — SIGNIFICANT CHANGE UP (ref 0.34–4.82)
WBC # BLD: 6.93 K/UL — SIGNIFICANT CHANGE UP (ref 3.8–10.5)
WBC # FLD AUTO: 6.93 K/UL — SIGNIFICANT CHANGE UP (ref 3.8–10.5)

## 2023-07-28 RX ADMIN — ENOXAPARIN SODIUM 40 MILLIGRAM(S): 100 INJECTION SUBCUTANEOUS at 06:17

## 2023-07-28 RX ADMIN — Medication 75 MICROGRAM(S): at 06:17

## 2023-07-28 RX ADMIN — ATENOLOL 50 MILLIGRAM(S): 25 TABLET ORAL at 06:17

## 2023-07-28 RX ADMIN — CEFTRIAXONE 100 MILLIGRAM(S): 500 INJECTION, POWDER, FOR SOLUTION INTRAMUSCULAR; INTRAVENOUS at 10:51

## 2023-07-28 NOTE — CONSULT NOTE ADULT - SUBJECTIVE AND OBJECTIVE BOX
HPI:  Patient is a 73 y/o F with PMH HTN and hypothyroidism who was called back to the ED today for positive blood cultures. Patient came to the ED on 07/25/2023 with complains of fevers/chills, right flank pain, dysuria, increased urinary frequency and urgency. Patient tested positive for urinary tract infection and was discharged with cefuroxime 250mg BID. Patient reports that since starting antibiotic therapy she feels significantly better. Patient reports she still has generalized weakness, fatigue, back pain, and soreness in her legs. However, her complains of fever, dysuria and urinary frequency have improved. Patient reports she still does not feel at her baseline. Patient denies any lightheadedness, dizziness, chest pain, palpitations, shortness of breath, cough, nausea, vomiting, diarrhea, constipation, melena, or hematochezia. Patient denies any other complains at this time.  (27 Jul 2023 12:53)    REVIEW OF SYSTEMS:  [  ] Not able to elicit  General:	  Chest:	  GI:	  :  Skin:	  Musculoskeletal:	  Neuro:    PAST MEDICAL & SURGICAL HISTORY:  HTN (hypertension)      Hypothyroidism      History of appendectomy        ALLERGIES: No Known Allergies    MEDS:  acetaminophen     Tablet .. 650 milliGRAM(s) Oral every 6 hours PRN  atenolol  Tablet 50 milliGRAM(s) Oral daily  cefTRIAXone   IVPB 2000 milliGRAM(s) IV Intermittent every 24 hours  enoxaparin Injectable 40 milliGRAM(s) SubCutaneous every 24 hours  levothyroxine 75 MICROGram(s) Oral daily    SOCIAL HISTORY:  Smoker:      FAMILY HISTORY:    VITALS:  Vital Signs Last 24 Hrs  T(C): 37.7 (28 Jul 2023 05:11), Max: 39.4 (27 Jul 2023 19:37)  T(F): 99.9 (28 Jul 2023 05:11), Max: 102.9 (27 Jul 2023 19:37)  HR: 81 (28 Jul 2023 05:11) (67 - 85)  BP: 152/83 (28 Jul 2023 05:11) (109/56 - 152/83)  BP(mean): --  RR: 18 (28 Jul 2023 05:11) (18 - 19)  SpO2: 97% (28 Jul 2023 05:11) (95% - 98%)    Parameters below as of 28 Jul 2023 05:11  Patient On (Oxygen Delivery Method): room air          PHYSICAL EXAM:  Constitutional:  HEENT:  Neck:  Respiratory:  Cardiovascular:  Gastrointestinal:  Extremities:  Skin:  Ortho:  Neuro:      LABS/DIAGNOSTIC TESTS:                        13.2   6.93  )-----------( 171      ( 28 Jul 2023 06:44 )             39.8     WBC Count: 6.93 K/uL (07-28 @ 06:44)  WBC Count: 7.50 K/uL (07-27 @ 10:42)  WBC Count: 17.87 K/uL (07-25 @ 18:30)    07-28    140  |  107  |  10  ----------------------------<  112<H>  3.6   |  23  |  0.79    Ca    8.3<L>      28 Jul 2023 06:44  Phos  2.6     07-28  Mg     2.3     07-28    TPro  7.5  /  Alb  3.2<L>  /  TBili  0.3  /  DBili  x   /  AST  20  /  ALT  34  /  AlkPhos  88  07-27    Urinalysis Basic - ( 28 Jul 2023 06:44 )    Color: x / Appearance: x / SG: x / pH: x  Gluc: 112 mg/dL / Ketone: x  / Bili: x / Urobili: x   Blood: x / Protein: x / Nitrite: x   Leuk Esterase: x / RBC: x / WBC x   Sq Epi: x / Non Sq Epi: x / Bacteria: x      LIVER FUNCTIONS - ( 27 Jul 2023 10:42 )  Alb: 3.2 g/dL / Pro: 7.5 g/dL / ALK PHOS: 88 U/L / ALT: 34 U/L DA / AST: 20 U/L / GGT: x               ABG -     CULTURES:   .Blood Blood-Peripheral  07-25 @ 18:35   Growth in aerobic bottle: Escherichia coli  See previous culture [id number]  --    Growth in aerobic bottle: Gram Negative Rods      .Blood Blood-Peripheral  07-25 @ 18:30   Growth in anaerobic bottle: Escherichia coli  Growth in aerobic bottle: Gram Negative Rods  Direct identification is available within approximately 3-5  hours either by Blood Panel Multiplexed PCR or Direct  MALDI-TOF. Details: [email or web address]  --  Blood Culture PCR  Escherichia coli          RADIOLOGY:  < from: CT Abdomen and Pelvis No Cont (07.25.23 @ 18:25) >    ACC: [id number] EXAM:  CT ABDOMEN AND PELVIS   ORDERED BY: KAMILA GALDAMEZ     PROCEDURE DATE:  07/25/2023          INTERPRETATION:  CLINICAL INFORMATION: Bilateral flank pain    COMPARISON: None.    CONTRAST/COMPLICATIONS:  IV Contrast: NONE  Oral Contrast: NONE  Complications: None reported at time of study completion    PROCEDURE:  CT of the Abdomen and Pelvis was performed.  Sagittal and coronal reformats were performed.    FINDINGS:  LOWER CHEST: Fat-containing left diaphragmatic hernia posteriorly.    LIVER: Within normal limits.  BILE DUCTS: Normal caliber.  GALLBLADDER: Cholelithiasis.  SPLEEN: Within normal limits.  PANCREAS: Within normal limits.  ADRENALS: Within normal limits.  KIDNEYS/URETERS: No hydronephrosis or urolithiasis. Left perinephric   stranding and periureteral stranding on the left..    BLADDER: Minimally distended.  REPRODUCTIVE ORGANS: Uterus and adnexa within normal limits.    BOWEL: No bowel obstruction. Appendix is not visualized and may be   surgically absentwith clips seen at the base of the cecum  PERITONEUM: No ascites.  VESSELS: Mild atherosclerotic calcification  RETROPERITONEUM/LYMPH NODES: No lymphadenopathy.  ABDOMINAL WALL: Small fat-containing umbilical hernia.  BONES: Degenerative changes.    IMPRESSION:  Mild left perinephric and periureteral stranding. Please correlate for   pyelonephritis/ureteritis  Cholelithiasis without evidence of acute cholecystitis    --- End of Report ---            JEAN MARIE SUTHERLAND MD; Attending Radiologist  This document has been electronically signed. Jul 25 2023  6:47PM    < end of copied text >   HPI:  Patient is a 73 y/o F with PMH HTN and hypothyroidism who was called back to the ED today for positive blood cultures. Patient came to the ED on 07/25/2023 with complains of fevers/chills, right flank pain, dysuria, increased urinary frequency and urgency. Patient tested positive for urinary tract infection and was discharged with cefuroxime 250mg BID. Patient reports that since starting antibiotic therapy she feels significantly better. Patient reports she still has generalized weakness, fatigue, back pain, and soreness in her legs. However, her complains of fever, dysuria and urinary frequency have improved. Patient reports she still does not feel at her baseline. Patient denies any lightheadedness, dizziness, chest pain, palpitations, shortness of breath, cough, nausea, vomiting, diarrhea, constipation, melena, or hematochezia. Patient denies any other complains at this time.  (27 Jul 2023 12:53)    History as above, patient was recently in [facility] ED with UTI and was discharged on Ceftin 250mg po bid and was found to be bacteremic so was called back to be admitted.  At present, patient was seen laying comfortably in bed with no acute distress.  CT reveals left perinephric stranding and patient reports that she had left flank pain yesterday however has improved today and denies any urinary symptoms.  She had fevers at home and exhibited fevers here as well.  At this time, awaiting culture results.    REVIEW OF SYSTEMS:  [  ] Not able to elicit  General: fevers +, no chills, no malaise   Chest: no cough, no shortness of breath, no chest pain  GI: no nvd no abdominal pain  : no urinary symptoms  Skin: no rashes no cyanosis  Musculoskeletal: no trauma no LBP  Neuro: no ha's no dizziness     PAST MEDICAL & SURGICAL HISTORY:  HTN (hypertension)      Hypothyroidism      History of appendectomy        ALLERGIES: No Known Allergies    MEDS:  acetaminophen     Tablet .. 650 milliGRAM(s) Oral every 6 hours PRN  atenolol  Tablet 50 milliGRAM(s) Oral daily  cefTRIAXone   IVPB 2000 milliGRAM(s) IV Intermittent every 24 hours  enoxaparin Injectable 40 milliGRAM(s) SubCutaneous every 24 hours  levothyroxine 75 MICROGram(s) Oral daily    SOCIAL HISTORY:  Smoker:      FAMILY HISTORY:    VITALS:  Vital Signs Last 24 Hrs  T(C): 37.7 (28 Jul 2023 05:11), Max: 39.4 (27 Jul 2023 19:37)  T(F): 99.9 (28 Jul 2023 05:11), Max: 102.9 (27 Jul 2023 19:37)  HR: 81 (28 Jul 2023 05:11) (67 - 85)  BP: 152/83 (28 Jul 2023 05:11) (109/56 - 152/83)  BP(mean): --  RR: 18 (28 Jul 2023 05:11) (18 - 19)  SpO2: 97% (28 Jul 2023 05:11) (95% - 98%)    Parameters below as of 28 Jul 2023 05:11  Patient On (Oxygen Delivery Method): room air          PHYSICAL EXAM:  Constitutional: Well developed, well groomed, no distress  HEENT: normocephalic with moist oral mucosa  Neck: supple no LN's no JVD  Respiratory: normal, airway patent, breath sounds equal, clear to auscultate bilateraly, no rales, no rhonchi, no wheeze  Cardiovascular: Regular rate and rhythm, no murmurs  Gastrointestinal: +BS, normal, soft, nontender, nondistended, no rebound tenderness, no guarding, no rigidity, no organomegaly, no CVA tenderness  Extremities: no edema, no cyanosis, no clubbing  Skin: no rashes  Ortho: no jt swelling  Neuro: AAO x 4      LABS/DIAGNOSTIC TESTS:                        13.2   6.93  )-----------( 171      ( 28 Jul 2023 06:44 )             39.8     WBC Count: 6.93 K/uL (07-28 @ 06:44)  WBC Count: 7.50 K/uL (07-27 @ 10:42)  WBC Count: 17.87 K/uL (07-25 @ 18:30)    07-28    140  |  107  |  10  ----------------------------<  112<H>  3.6   |  23  |  0.79    Ca    8.3<L>      28 Jul 2023 06:44  Phos  2.6     07-28  Mg     2.3     07-28    TPro  7.5  /  Alb  3.2<L>  /  TBili  0.3  /  DBili  x   /  AST  20  /  ALT  34  /  AlkPhos  88  07-27    Urinalysis Basic - ( 28 Jul 2023 06:44 )    Color: x / Appearance: x / SG: x / pH: x  Gluc: 112 mg/dL / Ketone: x  / Bili: x / Urobili: x   Blood: x / Protein: x / Nitrite: x   Leuk Esterase: x / RBC: x / WBC x   Sq Epi: x / Non Sq Epi: x / Bacteria: x      LIVER FUNCTIONS - ( 27 Jul 2023 10:42 )  Alb: 3.2 g/dL / Pro: 7.5 g/dL / ALK PHOS: 88 U/L / ALT: 34 U/L DA / AST: 20 U/L / GGT: x               ABG -     CULTURES:   .Blood Blood-Peripheral  07-25 @ 18:35   Growth in aerobic bottle: Escherichia coli  See previous culture [id number]  --    Growth in aerobic bottle: Gram Negative Rods      .Blood Blood-Peripheral  07-25 @ 18:30   Growth in anaerobic bottle: Escherichia coli  Growth in aerobic bottle: Gram Negative Rods  Direct identification is available within approximately 3-5  hours either by Blood Panel Multiplexed PCR or Direct  MALDI-TOF. Details: [email or web address]  --  Blood Culture PCR  Escherichia coli          RADIOLOGY:  < from: CT Abdomen and Pelvis No Cont (07.25.23 @ 18:25) >    ACC: [id number] EXAM:  CT ABDOMEN AND PELVIS   ORDERED BY: KAMILA GALDAMEZ     PROCEDURE DATE:  07/25/2023          INTERPRETATION:  CLINICAL INFORMATION: Bilateral flank pain    COMPARISON: None.    CONTRAST/COMPLICATIONS:  IV Contrast: NONE  Oral Contrast: NONE  Complications: None reported at time of study completion    PROCEDURE:  CT of the Abdomen and Pelvis was performed.  Sagittal and coronal reformats were performed.    FINDINGS:  LOWER CHEST: Fat-containing left diaphragmatic hernia posteriorly.    LIVER: Within normal limits.  BILE DUCTS: Normal caliber.  GALLBLADDER: Cholelithiasis.  SPLEEN: Within normal limits.  PANCREAS: Within normal limits.  ADRENALS: Within normal limits.  KIDNEYS/URETERS: No hydronephrosis or urolithiasis. Left perinephric   stranding and periureteral stranding on the left..    BLADDER: Minimally distended.  REPRODUCTIVE ORGANS: Uterus and adnexa within normal limits.    BOWEL: No bowel obstruction. Appendix is not visualized and may be   surgically absentwith clips seen at the base of the cecum  PERITONEUM: No ascites.  VESSELS: Mild atherosclerotic calcification  RETROPERITONEUM/LYMPH NODES: No lymphadenopathy.  ABDOMINAL WALL: Small fat-containing umbilical hernia.  BONES: Degenerative changes.    IMPRESSION:  Mild left perinephric and periureteral stranding. Please correlate for   pyelonephritis/ureteritis  Cholelithiasis without evidence of acute cholecystitis    --- End of Report ---            JEAN MARIE SUTHERLAND MD; Attending Radiologist  This document has been electronically signed. Jul 25 2023  6:47PM    < end of copied text >

## 2023-07-28 NOTE — PATIENT PROFILE ADULT - FALL HARM RISK - HARM RISK INTERVENTIONS

## 2023-07-28 NOTE — CONSULT NOTE ADULT - ASSESSMENT
Left sided Pyelonephritis  Bacteremia  Fevers    Plan: Continue Rocephin 2g iv daily  Awaiting culture results

## 2023-07-28 NOTE — PATIENT PROFILE ADULT - FUNCTIONAL ASSESSMENT - BASIC MOBILITY 4.
3 yo came to ER with mom c/o of not acting normal, kneeling on the floor, unable to sit down and patient keeps pointing at his belly and crying. Mom states poop is hard, round and little this AM. Belly round and firm up assessment.
4 = No assist / stand by assistance

## 2023-07-29 RX ADMIN — Medication 75 MICROGRAM(S): at 06:12

## 2023-07-29 RX ADMIN — ENOXAPARIN SODIUM 40 MILLIGRAM(S): 100 INJECTION SUBCUTANEOUS at 06:12

## 2023-07-29 RX ADMIN — CEFTRIAXONE 100 MILLIGRAM(S): 500 INJECTION, POWDER, FOR SOLUTION INTRAMUSCULAR; INTRAVENOUS at 09:59

## 2023-07-29 RX ADMIN — Medication 650 MILLIGRAM(S): at 18:53

## 2023-07-29 RX ADMIN — Medication 650 MILLIGRAM(S): at 19:23

## 2023-07-29 RX ADMIN — ATENOLOL 50 MILLIGRAM(S): 25 TABLET ORAL at 06:11

## 2023-07-29 NOTE — PROGRESS NOTE ADULT - PROBLEM SELECTOR PLAN 1
Pt p/w fever 102.9F, lactate 2.1, wbc 17k  - CT scan with left sided pyelo  - Called back to ED for positive blood cultures.   - Bcx - E. Coli.   - UA+ve on this admission again.   - Likely 2/2 to UTI.   - Got 2 days of therapy with cefuroxime.   - S/p Ceftriaxone in ED.   - C/w ceftriaxone for 8 more days (10 day of total therapy)  - F/U repeat Bcx. No growth to date     D/C planning am tomorrow   - F/U Ucx.
Pt p/w fever 102.9F, lactate 2.1, wbc 17k  - CT scan with left sided pyelo  - Called back to ED for positive blood cultures.   - Bcx - E. Coli.   - UA+ve on this admission again.   - Likely 2/2 to UTI.   - Got 2 days of therapy with cefuroxime.   - S/p Ceftriaxone in ED.   - C/w ceftriaxone for 8 more days (10 day of total therapy)  - F/U repeat Bcx.   - F/U Ucx.

## 2023-07-29 NOTE — PROGRESS NOTE ADULT - PROBLEM SELECTOR PLAN 7
CT a/p noted for cholelithiasis  f/u outpatient PCP
CT a/p noted for cholelithiasis  f/u outpatient PCP

## 2023-07-30 ENCOUNTER — TRANSCRIPTION ENCOUNTER (OUTPATIENT)
Age: 73
End: 2023-07-30

## 2023-07-30 VITALS
HEART RATE: 70 BPM | RESPIRATION RATE: 18 BRPM | SYSTOLIC BLOOD PRESSURE: 130 MMHG | TEMPERATURE: 98 F | OXYGEN SATURATION: 98 % | DIASTOLIC BLOOD PRESSURE: 67 MMHG

## 2023-07-30 PROCEDURE — 85027 COMPLETE CBC AUTOMATED: CPT

## 2023-07-30 PROCEDURE — 87150 DNA/RNA AMPLIFIED PROBE: CPT

## 2023-07-30 PROCEDURE — 87186 SC STD MICRODIL/AGAR DIL: CPT

## 2023-07-30 PROCEDURE — 86703 HIV-1/HIV-2 1 RESULT ANTBDY: CPT

## 2023-07-30 PROCEDURE — 83880 ASSAY OF NATRIURETIC PEPTIDE: CPT

## 2023-07-30 PROCEDURE — 83690 ASSAY OF LIPASE: CPT

## 2023-07-30 PROCEDURE — 99285 EMERGENCY DEPT VISIT HI MDM: CPT | Mod: 25

## 2023-07-30 PROCEDURE — 71045 X-RAY EXAM CHEST 1 VIEW: CPT

## 2023-07-30 PROCEDURE — 96365 THER/PROPH/DIAG IV INF INIT: CPT

## 2023-07-30 PROCEDURE — 80053 COMPREHEN METABOLIC PANEL: CPT

## 2023-07-30 PROCEDURE — 81001 URINALYSIS AUTO W/SCOPE: CPT

## 2023-07-30 PROCEDURE — 86803 HEPATITIS C AB TEST: CPT

## 2023-07-30 PROCEDURE — 85025 COMPLETE CBC W/AUTO DIFF WBC: CPT

## 2023-07-30 PROCEDURE — 83605 ASSAY OF LACTIC ACID: CPT

## 2023-07-30 PROCEDURE — 87086 URINE CULTURE/COLONY COUNT: CPT

## 2023-07-30 PROCEDURE — 36415 COLL VENOUS BLD VENIPUNCTURE: CPT

## 2023-07-30 PROCEDURE — 99284 EMERGENCY DEPT VISIT MOD MDM: CPT | Mod: 25

## 2023-07-30 PROCEDURE — 84443 ASSAY THYROID STIM HORMONE: CPT

## 2023-07-30 PROCEDURE — 83735 ASSAY OF MAGNESIUM: CPT

## 2023-07-30 PROCEDURE — 96375 TX/PRO/DX INJ NEW DRUG ADDON: CPT

## 2023-07-30 PROCEDURE — 80048 BASIC METABOLIC PNL TOTAL CA: CPT

## 2023-07-30 PROCEDURE — 84100 ASSAY OF PHOSPHORUS: CPT

## 2023-07-30 PROCEDURE — 87040 BLOOD CULTURE FOR BACTERIA: CPT

## 2023-07-30 PROCEDURE — 96366 THER/PROPH/DIAG IV INF ADDON: CPT

## 2023-07-30 PROCEDURE — 87077 CULTURE AEROBIC IDENTIFY: CPT

## 2023-07-30 PROCEDURE — 84484 ASSAY OF TROPONIN QUANT: CPT

## 2023-07-30 PROCEDURE — 74176 CT ABD & PELVIS W/O CONTRAST: CPT | Mod: MA

## 2023-07-30 RX ORDER — CEFUROXIME AXETIL 250 MG
1 TABLET ORAL
Qty: 12 | Refills: 0
Start: 2023-07-30 | End: 2023-08-04

## 2023-07-30 RX ADMIN — CEFTRIAXONE 100 MILLIGRAM(S): 500 INJECTION, POWDER, FOR SOLUTION INTRAMUSCULAR; INTRAVENOUS at 10:04

## 2023-07-30 RX ADMIN — ATENOLOL 50 MILLIGRAM(S): 25 TABLET ORAL at 05:31

## 2023-07-30 RX ADMIN — Medication 75 MICROGRAM(S): at 05:30

## 2023-07-30 RX ADMIN — Medication 650 MILLIGRAM(S): at 05:31

## 2023-07-30 RX ADMIN — Medication 650 MILLIGRAM(S): at 06:22

## 2023-07-30 RX ADMIN — ENOXAPARIN SODIUM 40 MILLIGRAM(S): 100 INJECTION SUBCUTANEOUS at 05:32

## 2023-07-30 NOTE — DISCHARGE NOTE NURSING/CASE MANAGEMENT/SOCIAL WORK - NSPROMEDSRETURNEDYESNO_GEN_A_NUR
Problem: Patient Care Overview  Goal: Plan of Care Review  Outcome: Ongoing (interventions implemented as appropriate)   09/12/18 7445   Coping/Psychosocial   Plan Of Care Reviewed With patient   No falls, trauma or injury this shift. No pressure injuries observed. Infection managed  With IV Zosyn every 8 hours. Hydration with IV fluids started.Continue with plan of care and continue to monitor patient.        all meds from pharmacy returned to pt

## 2023-07-30 NOTE — PROGRESS NOTE ADULT - ASSESSMENT
Left sided Pyelonephritis  Bacteremia  Fevers - resolved    Plan:   ·	possible dc planning tomorrow  ·	Continue Rocephin 2g iv daily  D3, needs 11 days more  ·	if discharged tomorrow can be switched to ceftin 500mgs PO BID for remaining days   
Left sided Pyelonephritis  Bacteremia  S/p fevers     Plan:   ·	dc planning today  ·	dc on ceftin 500mgs PO BID for 10 days more  ·	reconsult prn  
71 y/o F, Qatari speaking, from home with PMH HTN and hypothyroidism who was called back to the ED for positive blood cultures. Patient came to the ED on 07/25/2023 with complains of fevers/chills, right flank pain, dysuria, increased urinary frequency and urgency. Patient is being admitted for further management of septicemia 2/2 UTI/pyelonephritis.      
73 y/o F, Togolese speaking, from home with PMH HTN and hypothyroidism who was called back to the ED for positive blood cultures. Patient came to the ED on 07/25/2023 with complains of fevers/chills, right flank pain, dysuria, increased urinary frequency and urgency. Patient is being admitted for further management of septicemia 2/2 UTI/pyelonephritis.

## 2023-07-30 NOTE — PROGRESS NOTE ADULT - NS ATTEND AMEND GEN_ALL_CORE FT
I agree with above
I agree with above
73 y/o F with PMH HTN and hypothyroidism who was called back to the ED today for positive blood cultures. Patient came to the ED on 07/25/2023 with complains of fevers/chills, right flank pain, dysuria, increased urinary frequency and urgency. Patient tested positive for urinary tract infection and was discharged with cefuroxime 250mg BID.    T(C): 37.2 (07-28-23 @ 13:19), Max: 37.2 (07-28-23 @ 13:19)  HR: 69 (07-28-23 @ 13:19) (69 - 69)  BP: 131/81 (07-28-23 @ 13:19) (131/81 - 131/81)  RR: 18 (07-28-23 @ 13:19) (18 - 18)  SpO2: 96% (07-28-23 @ 13:19) (96% - 96%)  Reviewed labs and imaging     E coli Bacteremia   Acute Pyelonephritis   Hypothyroidism   HTN     ID consult appreciated   Continue with Ceftriaxone

## 2023-07-30 NOTE — DISCHARGE NOTE NURSING/CASE MANAGEMENT/SOCIAL WORK - PATIENT PORTAL LINK FT
You can access the FollowMyHealth Patient Portal offered by [facility] by registering at the following website: [email or web address]. By joining [facility]’s FollowMyHealth portal, you will also be able to view your health information using other applications (apps) compatible with our system.

## 2023-07-30 NOTE — DISCHARGE NOTE PROVIDER - CARE PROVIDER_API CALL
Amadeo Villafana [facility]  Internal Medicine  [address] [place] [address]  Phone: [phone]  Fax: [phone]  Established Patient  Follow Up Time: 1 week

## 2023-07-30 NOTE — PROGRESS NOTE ADULT - SUBJECTIVE AND OBJECTIVE BOX
Patient is a 72y old  Female who presents with a chief complaint of     OVERNIGHT EVENTS: no acute changes.     Pt is sitting up in bed, eating well, comfortable appearing and ambulating independently.     REVIEW OF SYSTEMS:  used #[id number]  CONSTITUTIONAL: No fever, chills  ENMT:  No difficulty hearing, no change in vision  NECK: No pain or stiffness  RESPIRATORY: No cough, SOB  CARDIOVASCULAR: No chest pain, palpitations  GASTROINTESTINAL: No abdominal pain. No nausea, vomiting, or diarrhea  GENITOURINARY: No dysuria  NEUROLOGICAL: No HA  SKIN: No itching, burning, rashes, or lesions   LYMPH NODES: No enlarged glands  ENDOCRINE: No heat or cold intolerance; No hair loss  MUSCULOSKELETAL: +left lower back pain  PSYCHIATRIC: No depression, anxiety  HEME/LYMPH: No easy bruising, or bleeding gums    T(C): 37.7 (07-28-23 @ 05:11), Max: 39.4 (07-27-23 @ 19:37)  HR: 81 (07-28-23 @ 05:11) (67 - 85)  BP: 152/83 (07-28-23 @ 05:11) (109/56 - 152/83)  RR: 18 (07-28-23 @ 05:11) (18 - 19)  SpO2: 97% (07-28-23 @ 05:11) (95% - 98%)  Wt(kg): --Vital Signs Last 24 Hrs  T(C): 37.7 (28 Jul 2023 05:11), Max: 39.4 (27 Jul 2023 19:37)  T(F): 99.9 (28 Jul 2023 05:11), Max: 102.9 (27 Jul 2023 19:37)  HR: 81 (28 Jul 2023 05:11) (67 - 85)  BP: 152/83 (28 Jul 2023 05:11) (109/56 - 152/83)  BP(mean): --  RR: 18 (28 Jul 2023 05:11) (18 - 19)  SpO2: 97% (28 Jul 2023 05:11) (95% - 98%)    Parameters below as of 28 Jul 2023 05:11  Patient On (Oxygen Delivery Method): room air        MEDICATIONS  (STANDING):  atenolol  Tablet 50 milliGRAM(s) Oral daily  cefTRIAXone   IVPB 2000 milliGRAM(s) IV Intermittent every 24 hours  enoxaparin Injectable 40 milliGRAM(s) SubCutaneous every 24 hours  levothyroxine 75 MICROGram(s) Oral daily    MEDICATIONS  (PRN):  acetaminophen     Tablet .. 650 milliGRAM(s) Oral every 6 hours PRN Temp greater or equal to 38C (100.4F), Mild Pain (1 - 3)      PHYSICAL EXAM:  GENERAL: NAD  EYES: clear conjunctiva  ENMT: Moist mucous membranes  NECK: Supple, No JVD, Normal thyroid  CHEST/LUNG: Clear to auscultation bilaterally; No rales, rhonchi, wheezing, or rubs  HEART: S1, S2, Regular rate and rhythm  ABDOMEN: +Mild Left CVA tenderness. Soft, Nontender, Nondistended; Bowel sounds present  NEURO: Alert & Oriented X3  EXTREMITIES: No LE edema, no calf tenderness  LYMPH: No lymphadenopathy noted  SKIN: No rashes or lesions    Consultant(s) Notes Reviewed:  [x ] YES  [ ] NO  Care Discussed with Consultants/Other Providers [ x] YES  [ ] NO    LABS:                        13.2   6.93  )-----------( 171      ( 28 Jul 2023 06:44 )             39.8     07-28    140  |  107  |  10  ----------------------------<  112<H>  3.6   |  23  |  0.79    Ca    8.3<L>      28 Jul 2023 06:44  Phos  2.6     07-28  Mg     2.3     07-28    TPro  7.5  /  Alb  3.2<L>  /  TBili  0.3  /  DBili  x   /  AST  20  /  ALT  34  /  AlkPhos  88  07-27      CAPILLARY BLOOD GLUCOSE            Urinalysis Basic - ( 28 Jul 2023 06:44 )    Color: x / Appearance: x / SG: x / pH: x  Gluc: 112 mg/dL / Ketone: x  / Bili: x / Urobili: x   Blood: x / Protein: x / Nitrite: x   Leuk Esterase: x / RBC: x / WBC x   Sq Epi: x / Non Sq Epi: x / Bacteria: x        RADIOLOGY & ADDITIONAL TESTS:  < from: CT Abdomen and Pelvis No Cont (07.25.23 @ 18:25) >    ACC: [id number] EXAM:  CT ABDOMEN AND PELVIS   ORDERED BY: KAMILA GALDAMEZ     PROCEDURE DATE:  07/25/2023          INTERPRETATION:  CLINICAL INFORMATION: Bilateral flank pain    COMPARISON: None.    CONTRAST/COMPLICATIONS:  IV Contrast: NONE  Oral Contrast: NONE  Complications: None reported at time of study completion    PROCEDURE:  CT of the Abdomen and Pelvis was performed.  Sagittal and coronal reformats were performed.    FINDINGS:  LOWER CHEST: Fat-containing left diaphragmatic hernia posteriorly.    LIVER: Within normal limits.  BILE DUCTS: Normal caliber.  GALLBLADDER: Cholelithiasis.  SPLEEN: Within normal limits.  PANCREAS: Within normal limits.  ADRENALS: Within normal limits.  KIDNEYS/URETERS: No hydronephrosis or urolithiasis. Left perinephric   stranding and periureteral stranding on the left..    BLADDER: Minimally distended.  REPRODUCTIVE ORGANS: Uterus and adnexa within normal limits.    BOWEL: No bowel obstruction. Appendix is not visualized and may be   surgically absentwith clips seen at the base of the cecum  PERITONEUM: No ascites.  VESSELS: Mild atherosclerotic calcification  RETROPERITONEUM/LYMPH NODES: No lymphadenopathy.  ABDOMINAL WALL: Small fat-containing umbilical hernia.  BONES: Degenerative changes.    IMPRESSION:  Mild left perinephric and periureteral stranding. Please correlate for   pyelonephritis/ureteritis  Cholelithiasis without evidence of acute cholecystitis    --- End of Report ---            JEAN MARIE SUTHERLAND MD; Attending Radiologist  This document has been electronically signed. Jul 25 2023  6:47PM    < end of copied text >    Imaging Personally Reviewed:  [ ] YES  [ ] NO  
72y Female is under our care for left sided pyelonephritis, bacteremia, and fevers. Patient was seen at bedside and is all dressed and ready to go. She states she is doing very well and has no complaints at this time.  No recurrent fevers for over 2 days. Dc planning today on PO antibiotics.     MEDS:  cefTRIAXone   IVPB 2000 milliGRAM(s) IV Intermittent every 24 hours    ALLERGIES: Allergies    No Known Allergies    Intolerances    REVIEW OF SYSTEMS:  [  ] Not able to elicit  General: no fevers no malaise  Chest: no cough no sob  GI: no nvd  : no urinary sxs   Skin: no rashes  Musculoskeletal: no trauma no LBP  Neuro: no ha's no dizziness     VITALS:  Vital Signs Last 24 Hrs  T(C): 36.6 (07-30-23 @ 12:15), Max: 36.7 (07-29-23 @ 13:46)  T(F): 97.9 (07-30-23 @ 12:15), Max: 98 (07-29-23 @ 13:46)  HR: 70 (07-30-23 @ 12:15) (63 - 74)  BP: 130/67 (07-30-23 @ 12:15) (127/65 - 147/77)  BP(mean): --  RR: 18 (07-30-23 @ 12:15) (17 - 18)  SpO2: 98% (07-30-23 @ 12:15) (97% - 98%)    PHYSICAL EXAM:  HEENT: n/a  Neck: supple no LN's   Respiratory: lungs clear no rales  Cardiovascular: S1 S2 reg no murmurs  Gastrointestinal: +BS with soft, nondistended abdomen; nontender  Extremities: no edema  Skin: no rashes  Ortho: n/a  Neuro: awake and alert    LABS/DIAGNOSTIC TESTS:  No new labs                         13.2   6.93  )-----------( 171      ( 28 Jul 2023 06:44 )             39.8     WBC Count: 6.93 K/uL (07-28 @ 06:44)  WBC Count: 7.50 K/uL (07-27 @ 10:42)  WBC Count: 17.87 K/uL (07-25 @ 18:30)    07-28    140  |  107  |  10  ----------------------------<  112<H>  3.6   |  23  |  0.79    Ca    8.3<L>      28 Jul 2023 06:44  Phos  2.6     07-28  Mg     2.3     07-28      CULTURES:   .Blood Blood-Peripheral  07-27 @ 10:40   No growth at 48 Hours  --  --      .Blood Blood-Peripheral  07-27 @ 10:30   No growth at 48 Hours  --  --      .Blood Blood-Peripheral  07-25 @ 18:35   Growth in aerobic bottle: Escherichia coli  See previous culture [id number]  --    Growth in aerobic bottle: Gram Negative Rods      .Blood Blood-Peripheral  07-25 @ 18:30   Growth in aerobic and anaerobic bottles: Escherichia coli  Direct identification is available within approximately 3-5  hours either by Blood Panel Multiplexed PCR or Direct  MALDI-TOF. Details: [email or web address]  --  Blood Culture PCR  Escherichia coli        RADIOLOGY:  no new studies
72y Female is under our care for left sided pyelonephritis, bacteremia, and fevers. Patient was seen at bedside. She was found to be sitting up on bed having her lunch. She states she is doing better and admits dysuria and back pain have resolved. No recurrent fevers for the past 36 hours. Repeat BC remains negative.     MEDS:  cefTRIAXone   IVPB 2000 milliGRAM(s) IV Intermittent every 24 hours    ALLERGIES: Allergies    No Known Allergies    Intolerances    REVIEW OF SYSTEMS:  [  ] Not able to elicit  General: no fevers no malaise  Chest: no cough no sob  GI: no nvd  : no urinary sxs   Skin: no rashes  Musculoskeletal: no trauma no LBP  Neuro: no ha's no dizziness     VITALS:  Vital Signs Last 24 Hrs  T(C): 36.6 (29 Jul 2023 19:42), Max: 36.7 (29 Jul 2023 04:35)  T(F): 97.9 (29 Jul 2023 19:42), Max: 98.1 (29 Jul 2023 04:35)  HR: 63 (29 Jul 2023 19:42) (63 - 75)  BP: 127/65 (29 Jul 2023 19:42) (127/65 - 147/77)  BP(mean): --  RR: 18 (29 Jul 2023 19:42) (17 - 18)  SpO2: 97% (29 Jul 2023 19:42) (97% - 97%)    PHYSICAL EXAM:  HEENT: n/a  Neck: supple no LN's   Respiratory: lungs clear no rales  Cardiovascular: S1 S2 reg no murmurs  Gastrointestinal: +BS with soft, nondistended abdomen; nontender  Extremities: no edema  Skin: no rashes  Ortho: n/a  Neuro: AAO x 3    LABS/DIAGNOSTIC TESTS:  No new labs                         13.2   6.93  )-----------( 171      ( 28 Jul 2023 06:44 )             39.8     WBC Count: 6.93 K/uL (07-28 @ 06:44)  WBC Count: 7.50 K/uL (07-27 @ 10:42)  WBC Count: 17.87 K/uL (07-25 @ 18:30)    07-28    140  |  107  |  10  ----------------------------<  112<H>  3.6   |  23  |  0.79    Ca    8.3<L>      28 Jul 2023 06:44  Phos  2.6     07-28  Mg     2.3     07-28      CULTURES:   .Blood Blood-Peripheral  07-27 @ 10:40   No growth at 48 Hours  --  --      .Blood Blood-Peripheral  07-27 @ 10:30   No growth at 48 Hours  --  --      .Blood Blood-Peripheral  07-25 @ 18:35   Growth in aerobic bottle: Escherichia coli  See previous culture [id number]  --    Growth in aerobic bottle: Gram Negative Rods      .Blood Blood-Peripheral  07-25 @ 18:30   Growth in aerobic and anaerobic bottles: Escherichia coli  Direct identification is available within approximately 3-5  hours either by Blood Panel Multiplexed PCR or Direct  MALDI-TOF. Details: [email or web address]  --  Blood Culture PCR  Escherichia coli        RADIOLOGY:  no new studies
Patient is a 72y old  Female who presents with a chief complaint of     OVERNIGHT EVENTS: no acute changes.     Pt is sitting up in bed, eating well, comfortable appearing and ambulating independently.     REVIEW OF SYSTEMS:  used #[id number]  CONSTITUTIONAL: No fever, chills  ENMT:  No difficulty hearing, no change in vision  NECK: No pain or stiffness  RESPIRATORY: No cough, SOB  CARDIOVASCULAR: No chest pain, palpitations  GASTROINTESTINAL: No abdominal pain. No nausea, vomiting, or diarrhea  GENITOURINARY: No dysuria  NEUROLOGICAL: No HA  SKIN: No itching, burning, rashes, or lesions   LYMPH NODES: No enlarged glands  ENDOCRINE: No heat or cold intolerance; No hair loss  MUSCULOSKELETAL: +left lower back pain  PSYCHIATRIC: No depression, anxiety  HEME/LYMPH: No easy bruising, or bleeding gums      T(C): 36.7 (07-29-23 @ 13:46), Max: 36.7 (07-29-23 @ 13:46)  HR: 70 (07-29-23 @ 13:46) (70 - 70)  BP: 147/77 (07-29-23 @ 13:46) (147/77 - 147/77)  RR: 17 (07-29-23 @ 13:46) (17 - 17)  SpO2: 97% (07-29-23 @ 13:46) (97% - 97%)      MEDICATIONS  (STANDING):  atenolol  Tablet 50 milliGRAM(s) Oral daily  cefTRIAXone   IVPB 2000 milliGRAM(s) IV Intermittent every 24 hours  enoxaparin Injectable 40 milliGRAM(s) SubCutaneous every 24 hours  levothyroxine 75 MICROGram(s) Oral daily    MEDICATIONS  (PRN):  acetaminophen     Tablet .. 650 milliGRAM(s) Oral every 6 hours PRN Temp greater or equal to 38C (100.4F), Mild Pain (1 - 3)      PHYSICAL EXAM:  GENERAL: NAD  EYES: clear conjunctiva  ENMT: Moist mucous membranes  NECK: Supple, No JVD, Normal thyroid  CHEST/LUNG: Clear to auscultation bilaterally; No rales, rhonchi, wheezing, or rubs  HEART: S1, S2, Regular rate and rhythm  ABDOMEN: +Mild Left CVA tenderness. Soft, Nontender, Nondistended; Bowel sounds present  NEURO: Alert & Oriented X3  EXTREMITIES: No LE edema, no calf tenderness  LYMPH: No lymphadenopathy noted  SKIN: No rashes or lesions    Consultant(s) Notes Reviewed:  [x ] YES  [ ] NO  Care Discussed with Consultants/Other Providers [ x] YES  [ ] NO                            13.2   6.93  )-----------( 171      ( 28 Jul 2023 06:44 )             39.8                 CAPILLARY BLOOD GLUCOSE            Urinalysis Basic - ( 28 Jul 2023 06:44 )    Color: x / Appearance: x / SG: x / pH: x  Gluc: 112 mg/dL / Ketone: x  / Bili: x / Urobili: x   Blood: x / Protein: x / Nitrite: x   Leuk Esterase: x / RBC: x / WBC x   Sq Epi: x / Non Sq Epi: x / Bacteria: x        RADIOLOGY & ADDITIONAL TESTS:  < from: CT Abdomen and Pelvis No Cont (07.25.23 @ 18:25) >    ACC: [id number] EXAM:  CT ABDOMEN AND PELVIS   ORDERED BY: KAMILA GALDAMEZ     PROCEDURE DATE:  07/25/2023          INTERPRETATION:  CLINICAL INFORMATION: Bilateral flank pain    COMPARISON: None.    CONTRAST/COMPLICATIONS:  IV Contrast: NONE  Oral Contrast: NONE  Complications: None reported at time of study completion    PROCEDURE:  CT of the Abdomen and Pelvis was performed.  Sagittal and coronal reformats were performed.    FINDINGS:  LOWER CHEST: Fat-containing left diaphragmatic hernia posteriorly.    LIVER: Within normal limits.  BILE DUCTS: Normal caliber.  GALLBLADDER: Cholelithiasis.  SPLEEN: Within normal limits.  PANCREAS: Within normal limits.  ADRENALS: Within normal limits.  KIDNEYS/URETERS: No hydronephrosis or urolithiasis. Left perinephric   stranding and periureteral stranding on the left..    BLADDER: Minimally distended.  REPRODUCTIVE ORGANS: Uterus and adnexa within normal limits.    BOWEL: No bowel obstruction. Appendix is not visualized and may be   surgically absentwith clips seen at the base of the cecum  PERITONEUM: No ascites.  VESSELS: Mild atherosclerotic calcification  RETROPERITONEUM/LYMPH NODES: No lymphadenopathy.  ABDOMINAL WALL: Small fat-containing umbilical hernia.  BONES: Degenerative changes.    IMPRESSION:  Mild left perinephric and periureteral stranding. Please correlate for   pyelonephritis/ureteritis  Cholelithiasis without evidence of acute cholecystitis    --- End of Report ---            JEAN MARIE SUTHERLAND MD; Attending Radiologist  This document has been electronically signed. Jul 25 2023  6:47PM    < end of copied text >    Imaging Personally Reviewed:  [ ] YES  [ ] NO

## 2023-07-30 NOTE — DISCHARGE NOTE PROVIDER - NSDCMRMEDTOKEN_GEN_ALL_CORE_FT
atenolol 50 mg oral tablet: 1 tab(s) orally once a day  ibuprofen 400 mg oral tablet: 1 tab(s) orally 3 times a day as needed for  fever  levothyroxine 75 mcg (0.075 mg) oral tablet: 1 tab(s) orally once a day   atenolol 50 mg oral tablet: 1 tab(s) orally once a day  cefuroxime 500 mg oral tablet: 1 tab(s) orally 2 times a day  ibuprofen 400 mg oral tablet: 1 tab(s) orally 3 times a day as needed for  fever  levothyroxine 75 mcg (0.075 mg) oral tablet: 1 tab(s) orally once a day

## 2023-07-30 NOTE — DISCHARGE NOTE PROVIDER - HOSPITAL COURSE
73 y/o F, [place]orese speaking, from home with PMH HTN and hypothyroidism who was called back to the ED for positive blood cultures. Patient came to the ED on 07/25/2023 with complains of fevers/chills, right flank pain, dysuria, increased urinary frequency and urgency. Patient is being admitted for further management of septicemia 2/2 UTI/pyelonephritis.    ID consulted, no recurrent fevers for the past 36 hours. Repeat BC remains negative.   Patient was treated with Rocephin 2g iv daily, needs 11 days more. On discharge patient can be switched to Ceftin 500mgs PO BID for remaining 10 days.    Given patient's improved clinical status and current hemodynamic stability, decision was made to discharge.  Please refer to patient's complete medical chart with documents for a full hospital course, for this is only a brief summary.    73 y/o F, Tajik speaking, from home with PMH HTN and hypothyroidism who was called back to the ED for positive blood cultures. Patient came to the ED on 07/25/2023 with complains of fevers/chills, right flank pain, dysuria, increased urinary frequency and urgency. Patient is being admitted for further management of septicemia 2/2 UTI/pyelonephritis.    ID consulted, no recurrent fevers for the past 36 hours. Repeat BC remains negative.   Patient was treated with Rocephin 2g iv daily, needs 11 days more. On discharge patient can be switched to Ceftin 500mgs PO BID for remaining 10 days.    Patient has 16 tablet of Ceftin 250mg from last admission and was instructed to take 2 tablets two times a day which will last for 4 days of therapy.  New prescription was send to pharmacy for 500mg of Ceftin which patient will take for additional 6 days to complete full treatment.   [id number] Chase, patient verbalized understanding how to take antibiotics and when to complete treatment.     Patient will follow up with her primary provider within one week.    Given patient's improved clinical status and current hemodynamic stability, decision was made to discharge.  Please refer to patient's complete medical chart with documents for a full hospital course, for this is only a brief summary.

## 2023-08-01 LAB
CULTURE RESULTS: SIGNIFICANT CHANGE UP
CULTURE RESULTS: SIGNIFICANT CHANGE UP
SPECIMEN SOURCE: SIGNIFICANT CHANGE UP
SPECIMEN SOURCE: SIGNIFICANT CHANGE UP

## 2023-11-06 ENCOUNTER — EMERGENCY (EMERGENCY)
Facility: HOSPITAL | Age: 73
LOS: 1 days | Discharge: HOME | End: 2023-11-06
Attending: STUDENT IN AN ORGANIZED HEALTH CARE EDUCATION/TRAINING PROGRAM
Payer: MEDICARE

## 2023-11-06 VITALS
WEIGHT: 136.69 LBS | HEIGHT: 60 IN | TEMPERATURE: 98 F | HEART RATE: 60 BPM | DIASTOLIC BLOOD PRESSURE: 90 MMHG | OXYGEN SATURATION: 99 % | SYSTOLIC BLOOD PRESSURE: 170 MMHG | RESPIRATION RATE: 17 BRPM

## 2023-11-06 DIAGNOSIS — Z90.49 ACQUIRED ABSENCE OF OTHER SPECIFIED PARTS OF DIGESTIVE TRACT: Chronic | ICD-10-CM

## 2023-11-06 LAB
ALBUMIN SERPL ELPH-MCNC: 3.4 G/DL — LOW (ref 3.5–5)
ALBUMIN SERPL ELPH-MCNC: 3.4 G/DL — LOW (ref 3.5–5)
ALP SERPL-CCNC: 95 U/L — SIGNIFICANT CHANGE UP (ref 40–120)
ALP SERPL-CCNC: 95 U/L — SIGNIFICANT CHANGE UP (ref 40–120)
ALT FLD-CCNC: 31 U/L DA — SIGNIFICANT CHANGE UP (ref 10–60)
ALT FLD-CCNC: 31 U/L DA — SIGNIFICANT CHANGE UP (ref 10–60)
ANION GAP SERPL CALC-SCNC: 5 MMOL/L — SIGNIFICANT CHANGE UP (ref 5–17)
ANION GAP SERPL CALC-SCNC: 5 MMOL/L — SIGNIFICANT CHANGE UP (ref 5–17)
APPEARANCE UR: ABNORMAL
APPEARANCE UR: ABNORMAL
AST SERPL-CCNC: 29 U/L — SIGNIFICANT CHANGE UP (ref 10–40)
AST SERPL-CCNC: 29 U/L — SIGNIFICANT CHANGE UP (ref 10–40)
BACTERIA # UR AUTO: ABNORMAL /HPF
BACTERIA # UR AUTO: ABNORMAL /HPF
BASOPHILS # BLD AUTO: 0 K/UL — SIGNIFICANT CHANGE UP (ref 0–0.2)
BASOPHILS # BLD AUTO: 0 K/UL — SIGNIFICANT CHANGE UP (ref 0–0.2)
BASOPHILS NFR BLD AUTO: 0 % — SIGNIFICANT CHANGE UP (ref 0–2)
BASOPHILS NFR BLD AUTO: 0 % — SIGNIFICANT CHANGE UP (ref 0–2)
BILIRUB SERPL-MCNC: 0.5 MG/DL — SIGNIFICANT CHANGE UP (ref 0.2–1.2)
BILIRUB SERPL-MCNC: 0.5 MG/DL — SIGNIFICANT CHANGE UP (ref 0.2–1.2)
BILIRUB UR-MCNC: NEGATIVE — SIGNIFICANT CHANGE UP
BILIRUB UR-MCNC: NEGATIVE — SIGNIFICANT CHANGE UP
BUN SERPL-MCNC: 14 MG/DL — SIGNIFICANT CHANGE UP (ref 7–18)
BUN SERPL-MCNC: 14 MG/DL — SIGNIFICANT CHANGE UP (ref 7–18)
CALCIUM SERPL-MCNC: 8.5 MG/DL — SIGNIFICANT CHANGE UP (ref 8.4–10.5)
CALCIUM SERPL-MCNC: 8.5 MG/DL — SIGNIFICANT CHANGE UP (ref 8.4–10.5)
CHLORIDE SERPL-SCNC: 108 MMOL/L — SIGNIFICANT CHANGE UP (ref 96–108)
CHLORIDE SERPL-SCNC: 108 MMOL/L — SIGNIFICANT CHANGE UP (ref 96–108)
CO2 SERPL-SCNC: 28 MMOL/L — SIGNIFICANT CHANGE UP (ref 22–31)
CO2 SERPL-SCNC: 28 MMOL/L — SIGNIFICANT CHANGE UP (ref 22–31)
COLOR SPEC: YELLOW — SIGNIFICANT CHANGE UP
COLOR SPEC: YELLOW — SIGNIFICANT CHANGE UP
CREAT SERPL-MCNC: 0.84 MG/DL — SIGNIFICANT CHANGE UP (ref 0.5–1.3)
CREAT SERPL-MCNC: 0.84 MG/DL — SIGNIFICANT CHANGE UP (ref 0.5–1.3)
DIFF PNL FLD: ABNORMAL
DIFF PNL FLD: ABNORMAL
EGFR: 74 ML/MIN/1.73M2 — SIGNIFICANT CHANGE UP
EGFR: 74 ML/MIN/1.73M2 — SIGNIFICANT CHANGE UP
EOSINOPHIL # BLD AUTO: 0.24 K/UL — SIGNIFICANT CHANGE UP (ref 0–0.5)
EOSINOPHIL # BLD AUTO: 0.24 K/UL — SIGNIFICANT CHANGE UP (ref 0–0.5)
EOSINOPHIL NFR BLD AUTO: 3 % — SIGNIFICANT CHANGE UP (ref 0–6)
EOSINOPHIL NFR BLD AUTO: 3 % — SIGNIFICANT CHANGE UP (ref 0–6)
EPI CELLS # UR: PRESENT
EPI CELLS # UR: PRESENT
GLUCOSE SERPL-MCNC: 90 MG/DL — SIGNIFICANT CHANGE UP (ref 70–99)
GLUCOSE SERPL-MCNC: 90 MG/DL — SIGNIFICANT CHANGE UP (ref 70–99)
GLUCOSE UR QL: NEGATIVE MG/DL — SIGNIFICANT CHANGE UP
GLUCOSE UR QL: NEGATIVE MG/DL — SIGNIFICANT CHANGE UP
HCT VFR BLD CALC: 41.7 % — SIGNIFICANT CHANGE UP (ref 34.5–45)
HCT VFR BLD CALC: 41.7 % — SIGNIFICANT CHANGE UP (ref 34.5–45)
HGB BLD-MCNC: 13.4 G/DL — SIGNIFICANT CHANGE UP (ref 11.5–15.5)
HGB BLD-MCNC: 13.4 G/DL — SIGNIFICANT CHANGE UP (ref 11.5–15.5)
KETONES UR-MCNC: NEGATIVE MG/DL — SIGNIFICANT CHANGE UP
KETONES UR-MCNC: NEGATIVE MG/DL — SIGNIFICANT CHANGE UP
LEUKOCYTE ESTERASE UR-ACNC: ABNORMAL
LEUKOCYTE ESTERASE UR-ACNC: ABNORMAL
LIDOCAIN IGE QN: 17 U/L — SIGNIFICANT CHANGE UP (ref 13–75)
LIDOCAIN IGE QN: 17 U/L — SIGNIFICANT CHANGE UP (ref 13–75)
LYMPHOCYTES # BLD AUTO: 2.03 K/UL — SIGNIFICANT CHANGE UP (ref 1–3.3)
LYMPHOCYTES # BLD AUTO: 2.03 K/UL — SIGNIFICANT CHANGE UP (ref 1–3.3)
LYMPHOCYTES # BLD AUTO: 25 % — SIGNIFICANT CHANGE UP (ref 13–44)
LYMPHOCYTES # BLD AUTO: 25 % — SIGNIFICANT CHANGE UP (ref 13–44)
MAGNESIUM SERPL-MCNC: 2.3 MG/DL — SIGNIFICANT CHANGE UP (ref 1.6–2.6)
MAGNESIUM SERPL-MCNC: 2.3 MG/DL — SIGNIFICANT CHANGE UP (ref 1.6–2.6)
MANUAL SMEAR VERIFICATION: SIGNIFICANT CHANGE UP
MANUAL SMEAR VERIFICATION: SIGNIFICANT CHANGE UP
MCHC RBC-ENTMCNC: 29.5 PG — SIGNIFICANT CHANGE UP (ref 27–34)
MCHC RBC-ENTMCNC: 29.5 PG — SIGNIFICANT CHANGE UP (ref 27–34)
MCHC RBC-ENTMCNC: 32.1 GM/DL — SIGNIFICANT CHANGE UP (ref 32–36)
MCHC RBC-ENTMCNC: 32.1 GM/DL — SIGNIFICANT CHANGE UP (ref 32–36)
MCV RBC AUTO: 91.9 FL — SIGNIFICANT CHANGE UP (ref 80–100)
MCV RBC AUTO: 91.9 FL — SIGNIFICANT CHANGE UP (ref 80–100)
MONOCYTES # BLD AUTO: 0.16 K/UL — SIGNIFICANT CHANGE UP (ref 0–0.9)
MONOCYTES # BLD AUTO: 0.16 K/UL — SIGNIFICANT CHANGE UP (ref 0–0.9)
MONOCYTES NFR BLD AUTO: 2 % — SIGNIFICANT CHANGE UP (ref 2–14)
MONOCYTES NFR BLD AUTO: 2 % — SIGNIFICANT CHANGE UP (ref 2–14)
NEUTROPHILS # BLD AUTO: 5.68 K/UL — SIGNIFICANT CHANGE UP (ref 1.8–7.4)
NEUTROPHILS # BLD AUTO: 5.68 K/UL — SIGNIFICANT CHANGE UP (ref 1.8–7.4)
NEUTROPHILS NFR BLD AUTO: 70 % — SIGNIFICANT CHANGE UP (ref 43–77)
NEUTROPHILS NFR BLD AUTO: 70 % — SIGNIFICANT CHANGE UP (ref 43–77)
NITRITE UR-MCNC: POSITIVE
NITRITE UR-MCNC: POSITIVE
NRBC # BLD: 0 /100 — SIGNIFICANT CHANGE UP (ref 0–0)
NRBC # BLD: 0 /100 — SIGNIFICANT CHANGE UP (ref 0–0)
PH UR: 6.5 — SIGNIFICANT CHANGE UP (ref 5–8)
PH UR: 6.5 — SIGNIFICANT CHANGE UP (ref 5–8)
PLAT MORPH BLD: NORMAL — SIGNIFICANT CHANGE UP
PLAT MORPH BLD: NORMAL — SIGNIFICANT CHANGE UP
PLATELET # BLD AUTO: 222 K/UL — SIGNIFICANT CHANGE UP (ref 150–400)
PLATELET # BLD AUTO: 222 K/UL — SIGNIFICANT CHANGE UP (ref 150–400)
POTASSIUM SERPL-MCNC: 4.5 MMOL/L — SIGNIFICANT CHANGE UP (ref 3.5–5.3)
POTASSIUM SERPL-MCNC: 4.5 MMOL/L — SIGNIFICANT CHANGE UP (ref 3.5–5.3)
POTASSIUM SERPL-SCNC: 4.5 MMOL/L — SIGNIFICANT CHANGE UP (ref 3.5–5.3)
POTASSIUM SERPL-SCNC: 4.5 MMOL/L — SIGNIFICANT CHANGE UP (ref 3.5–5.3)
PROT SERPL-MCNC: 7.6 G/DL — SIGNIFICANT CHANGE UP (ref 6–8.3)
PROT SERPL-MCNC: 7.6 G/DL — SIGNIFICANT CHANGE UP (ref 6–8.3)
PROT UR-MCNC: NEGATIVE MG/DL — SIGNIFICANT CHANGE UP
PROT UR-MCNC: NEGATIVE MG/DL — SIGNIFICANT CHANGE UP
RBC # BLD: 4.54 M/UL — SIGNIFICANT CHANGE UP (ref 3.8–5.2)
RBC # BLD: 4.54 M/UL — SIGNIFICANT CHANGE UP (ref 3.8–5.2)
RBC # FLD: 13 % — SIGNIFICANT CHANGE UP (ref 10.3–14.5)
RBC # FLD: 13 % — SIGNIFICANT CHANGE UP (ref 10.3–14.5)
RBC BLD AUTO: NORMAL — SIGNIFICANT CHANGE UP
RBC BLD AUTO: NORMAL — SIGNIFICANT CHANGE UP
RBC CASTS # UR COMP ASSIST: 10 /HPF — HIGH (ref 0–4)
RBC CASTS # UR COMP ASSIST: 10 /HPF — HIGH (ref 0–4)
SODIUM SERPL-SCNC: 141 MMOL/L — SIGNIFICANT CHANGE UP (ref 135–145)
SODIUM SERPL-SCNC: 141 MMOL/L — SIGNIFICANT CHANGE UP (ref 135–145)
SP GR SPEC: 1.02 — SIGNIFICANT CHANGE UP (ref 1–1.03)
SP GR SPEC: 1.02 — SIGNIFICANT CHANGE UP (ref 1–1.03)
UROBILINOGEN FLD QL: 0.2 MG/DL — SIGNIFICANT CHANGE UP (ref 0.2–1)
UROBILINOGEN FLD QL: 0.2 MG/DL — SIGNIFICANT CHANGE UP (ref 0.2–1)
WBC # BLD: 8.12 K/UL — SIGNIFICANT CHANGE UP (ref 3.8–10.5)
WBC # BLD: 8.12 K/UL — SIGNIFICANT CHANGE UP (ref 3.8–10.5)
WBC # FLD AUTO: 8.12 K/UL — SIGNIFICANT CHANGE UP (ref 3.8–10.5)
WBC # FLD AUTO: 8.12 K/UL — SIGNIFICANT CHANGE UP (ref 3.8–10.5)
WBC UR QL: 5 /HPF — SIGNIFICANT CHANGE UP (ref 0–5)
WBC UR QL: 5 /HPF — SIGNIFICANT CHANGE UP (ref 0–5)

## 2023-11-06 PROCEDURE — 99285 EMERGENCY DEPT VISIT HI MDM: CPT

## 2023-11-06 PROCEDURE — 85025 COMPLETE CBC W/AUTO DIFF WBC: CPT

## 2023-11-06 PROCEDURE — 96374 THER/PROPH/DIAG INJ IV PUSH: CPT | Mod: XU

## 2023-11-06 PROCEDURE — 74177 CT ABD & PELVIS W/CONTRAST: CPT | Mod: MA

## 2023-11-06 PROCEDURE — 87086 URINE CULTURE/COLONY COUNT: CPT

## 2023-11-06 PROCEDURE — 96375 TX/PRO/DX INJ NEW DRUG ADDON: CPT

## 2023-11-06 PROCEDURE — 83690 ASSAY OF LIPASE: CPT

## 2023-11-06 PROCEDURE — 99284 EMERGENCY DEPT VISIT MOD MDM: CPT | Mod: 25

## 2023-11-06 PROCEDURE — 81001 URINALYSIS AUTO W/SCOPE: CPT

## 2023-11-06 PROCEDURE — 80053 COMPREHEN METABOLIC PANEL: CPT

## 2023-11-06 PROCEDURE — 83735 ASSAY OF MAGNESIUM: CPT

## 2023-11-06 PROCEDURE — 87186 SC STD MICRODIL/AGAR DIL: CPT

## 2023-11-06 PROCEDURE — 36415 COLL VENOUS BLD VENIPUNCTURE: CPT

## 2023-11-06 PROCEDURE — 74177 CT ABD & PELVIS W/CONTRAST: CPT | Mod: 26,MA

## 2023-11-06 RX ORDER — KETOROLAC TROMETHAMINE 30 MG/ML
30 SYRINGE (ML) INJECTION ONCE
Refills: 0 | Status: DISCONTINUED | OUTPATIENT
Start: 2023-11-06 | End: 2023-11-06

## 2023-11-06 RX ORDER — SODIUM CHLORIDE 9 MG/ML
1000 INJECTION INTRAMUSCULAR; INTRAVENOUS; SUBCUTANEOUS ONCE
Refills: 0 | Status: COMPLETED | OUTPATIENT
Start: 2023-11-06 | End: 2023-11-06

## 2023-11-06 RX ORDER — CEFTRIAXONE 500 MG/1
1000 INJECTION, POWDER, FOR SOLUTION INTRAMUSCULAR; INTRAVENOUS ONCE
Refills: 0 | Status: COMPLETED | OUTPATIENT
Start: 2023-11-06 | End: 2023-11-06

## 2023-11-06 RX ORDER — CEFPODOXIME PROXETIL 100 MG
1 TABLET ORAL
Qty: 14 | Refills: 0
Start: 2023-11-06 | End: 2023-11-12

## 2023-11-06 RX ADMIN — Medication 30 MILLIGRAM(S): at 12:40

## 2023-11-06 RX ADMIN — Medication 30 MILLIGRAM(S): at 10:56

## 2023-11-06 RX ADMIN — CEFTRIAXONE 100 MILLIGRAM(S): 500 INJECTION, POWDER, FOR SOLUTION INTRAMUSCULAR; INTRAVENOUS at 13:23

## 2023-11-06 RX ADMIN — SODIUM CHLORIDE 1000 MILLILITER(S): 9 INJECTION INTRAMUSCULAR; INTRAVENOUS; SUBCUTANEOUS at 10:56

## 2023-11-06 NOTE — ED PROVIDER NOTE - CLINICAL SUMMARY MEDICAL DECISION MAKING FREE TEXT BOX
72-year female presenting with diarrhea, abdominal pain and lower back pain.  Mild abdominal tenderness on exam.  Concern for diverticulitis versus kidney stone versus UTI/pyelonephritis.  Will get labs, UA and CT abdomen to assess.

## 2023-11-06 NOTE — ED PROVIDER NOTE - NSPTACCESSSVCSAPPTDETAILS_ED_ALL_ED_FT
Patient has a stone in the neck of the gallbladder but is currently without symptoms but will likely need gallbladder removed.

## 2023-11-06 NOTE — ED PROVIDER NOTE - PATIENT PORTAL LINK FT
You can access the FollowMyHealth Patient Portal offered by [facility] by registering at the following website: [email or web address]. By joining [facility]’s FollowMyHealth portal, you will also be able to view your health information using other applications (apps) compatible with our system.

## 2023-11-06 NOTE — ED PROVIDER NOTE - CARE PLAN
1 Principal Discharge DX:	UTI (urinary tract infection)  Secondary Diagnosis:	Abdominal pain  Secondary Diagnosis:	Lt flank pain

## 2023-11-06 NOTE — ED PROVIDER NOTE - NS ED MD DISPO DISCHARGE
Spoke to patient about moderate coronary artery disease with a calcium score 912.  Will start aspirin and statin.  Patient has follow-up visit in December.  He will call if he has any new symptoms.  
Home

## 2023-11-06 NOTE — ED ADULT NURSE NOTE - OBJECTIVE STATEMENT
Pt c/o abdominal pain that radiates to her low back pain x 10 days. Pt endorses diarrhea x days. PIV inserted, labs drawn and sent, medication administered per MAR. Pt updated on plan of care, pt verbalized understanding. Pt A&Ox4, respirations even and unlabored, skin warm and dry.

## 2023-11-06 NOTE — ED PROVIDER NOTE - OBJECTIVE STATEMENT
72-year-old female, PMH of HTN, presenting with 10 days of lower back and abdominal pain.  Denies any pain or burning with urination.  Reports pain became worse over the past 2 days.  Also has diarrhea but no fever, vomiting, chest pain or trouble breathing.

## 2023-11-06 NOTE — ED PROVIDER NOTE - PROGRESS NOTE DETAILS
patient updated on results. no RUQ pain on exam and no liver derangements. stable for outpatient surgery followup. Abimael Ma

## 2023-11-06 NOTE — ED PROVIDER NOTE - NSFOLLOWUPINSTRUCTIONS_ED_ALL_ED_FT
Lo atendieron en el departamento de emergencias por dolor abdominal y en el flanco del lado roseann y se descubrió que tenía america infección urinaria.    Jeri un seguimiento con corona médico de atención primaria dentro de las próximas 48 horas.    Jeri un seguimiento con un cirujano general sobre los cálculos biliares y la posible necesidad de extirpar la vesícula biliar.    Por favor termine todos catalina antibióticos.    Si tiene algún síntoma que empeora, dolor abdominal intenso, dolor o ardor al orinar, o si desarrolla fiebre intensa, dolor en el pecho o dificultad para respirar, regrese al departamento de emergencias.    Translation via Google Translate. Cannot guarantee accuracy.     You were seen in the emergency department for left-sided abdominal and flank pain and were found to have a UTI.    Please follow-up with your primary care doctor within the next 48 hours.     Please follow-up with a general surgeon regarding gallstones and the potential need for removal of your gallbladder.    Please finish all of your antibiotics.    If you have any worsening symptoms, severe abdominal pain, pain or burning with urination, or you develop a fever severe, chest pain, trouble breathing, please return to the emergency department.

## 2023-11-08 PROBLEM — Z00.00 ENCOUNTER FOR PREVENTIVE HEALTH EXAMINATION: Status: ACTIVE | Noted: 2023-11-08

## 2023-11-09 ENCOUNTER — APPOINTMENT (OUTPATIENT)
Dept: SURGERY | Facility: CLINIC | Age: 73
End: 2023-11-09
Payer: MEDICARE

## 2023-11-09 VITALS
DIASTOLIC BLOOD PRESSURE: 80 MMHG | BODY MASS INDEX: 26.9 KG/M2 | WEIGHT: 137 LBS | HEART RATE: 62 BPM | SYSTOLIC BLOOD PRESSURE: 172 MMHG | HEIGHT: 60 IN

## 2023-11-09 LAB
-  AMOXICILLIN/CLAVULANIC ACID: SIGNIFICANT CHANGE UP
-  AMOXICILLIN/CLAVULANIC ACID: SIGNIFICANT CHANGE UP
-  AMPICILLIN/SULBACTAM: SIGNIFICANT CHANGE UP
-  AMPICILLIN/SULBACTAM: SIGNIFICANT CHANGE UP
-  AMPICILLIN: SIGNIFICANT CHANGE UP
-  AMPICILLIN: SIGNIFICANT CHANGE UP
-  AZTREONAM: SIGNIFICANT CHANGE UP
-  AZTREONAM: SIGNIFICANT CHANGE UP
-  CEFAZOLIN: SIGNIFICANT CHANGE UP
-  CEFAZOLIN: SIGNIFICANT CHANGE UP
-  CEFEPIME: SIGNIFICANT CHANGE UP
-  CEFEPIME: SIGNIFICANT CHANGE UP
-  CEFOXITIN: SIGNIFICANT CHANGE UP
-  CEFOXITIN: SIGNIFICANT CHANGE UP
-  CEFTRIAXONE: SIGNIFICANT CHANGE UP
-  CEFTRIAXONE: SIGNIFICANT CHANGE UP
-  CEFUROXIME: SIGNIFICANT CHANGE UP
-  CEFUROXIME: SIGNIFICANT CHANGE UP
-  CIPROFLOXACIN: SIGNIFICANT CHANGE UP
-  CIPROFLOXACIN: SIGNIFICANT CHANGE UP
-  ERTAPENEM: SIGNIFICANT CHANGE UP
-  ERTAPENEM: SIGNIFICANT CHANGE UP
-  GENTAMICIN: SIGNIFICANT CHANGE UP
-  GENTAMICIN: SIGNIFICANT CHANGE UP
-  IMIPENEM: SIGNIFICANT CHANGE UP
-  IMIPENEM: SIGNIFICANT CHANGE UP
-  LEVOFLOXACIN: SIGNIFICANT CHANGE UP
-  LEVOFLOXACIN: SIGNIFICANT CHANGE UP
-  MEROPENEM: SIGNIFICANT CHANGE UP
-  MEROPENEM: SIGNIFICANT CHANGE UP
-  NITROFURANTOIN: SIGNIFICANT CHANGE UP
-  NITROFURANTOIN: SIGNIFICANT CHANGE UP
-  PIPERACILLIN/TAZOBACTAM: SIGNIFICANT CHANGE UP
-  PIPERACILLIN/TAZOBACTAM: SIGNIFICANT CHANGE UP
-  TOBRAMYCIN: SIGNIFICANT CHANGE UP
-  TOBRAMYCIN: SIGNIFICANT CHANGE UP
-  TRIMETHOPRIM/SULFAMETHOXAZOLE: SIGNIFICANT CHANGE UP
-  TRIMETHOPRIM/SULFAMETHOXAZOLE: SIGNIFICANT CHANGE UP
CULTURE RESULTS: ABNORMAL
CULTURE RESULTS: ABNORMAL
METHOD TYPE: SIGNIFICANT CHANGE UP
METHOD TYPE: SIGNIFICANT CHANGE UP
ORGANISM # SPEC MICROSCOPIC CNT: ABNORMAL
SPECIMEN SOURCE: SIGNIFICANT CHANGE UP
SPECIMEN SOURCE: SIGNIFICANT CHANGE UP

## 2023-11-09 PROCEDURE — 99203 OFFICE O/P NEW LOW 30 MIN: CPT

## 2023-11-09 RX ORDER — ATENOLOL 100 MG/1
100 TABLET ORAL
Refills: 0 | Status: ACTIVE | COMMUNITY

## 2023-11-17 ENCOUNTER — OUTPATIENT (OUTPATIENT)
Dept: OUTPATIENT SERVICES | Facility: HOSPITAL | Age: 73
LOS: 1 days | End: 2023-11-17
Payer: MEDICARE

## 2023-11-17 VITALS
OXYGEN SATURATION: 98 % | RESPIRATION RATE: 17 BRPM | DIASTOLIC BLOOD PRESSURE: 84 MMHG | HEART RATE: 63 BPM | WEIGHT: 136.91 LBS | SYSTOLIC BLOOD PRESSURE: 148 MMHG | HEIGHT: 62 IN | TEMPERATURE: 99 F

## 2023-11-17 DIAGNOSIS — Z90.49 ACQUIRED ABSENCE OF OTHER SPECIFIED PARTS OF DIGESTIVE TRACT: Chronic | ICD-10-CM

## 2023-11-17 DIAGNOSIS — K80.20 CALCULUS OF GALLBLADDER WITHOUT CHOLECYSTITIS WITHOUT OBSTRUCTION: ICD-10-CM

## 2023-11-17 DIAGNOSIS — Z01.818 ENCOUNTER FOR OTHER PREPROCEDURAL EXAMINATION: ICD-10-CM

## 2023-11-17 DIAGNOSIS — I10 ESSENTIAL (PRIMARY) HYPERTENSION: ICD-10-CM

## 2023-11-17 DIAGNOSIS — E03.9 HYPOTHYROIDISM, UNSPECIFIED: ICD-10-CM

## 2023-11-17 DIAGNOSIS — Z78.9 OTHER SPECIFIED HEALTH STATUS: ICD-10-CM

## 2023-11-17 LAB
APPEARANCE UR: CLEAR — SIGNIFICANT CHANGE UP
APPEARANCE UR: CLEAR — SIGNIFICANT CHANGE UP
BACTERIA # UR AUTO: ABNORMAL /HPF
BACTERIA # UR AUTO: ABNORMAL /HPF
BILIRUB UR-MCNC: NEGATIVE — SIGNIFICANT CHANGE UP
BILIRUB UR-MCNC: NEGATIVE — SIGNIFICANT CHANGE UP
BLD GP AB SCN SERPL QL: SIGNIFICANT CHANGE UP
BLD GP AB SCN SERPL QL: SIGNIFICANT CHANGE UP
COLOR SPEC: YELLOW — SIGNIFICANT CHANGE UP
COLOR SPEC: YELLOW — SIGNIFICANT CHANGE UP
DIFF PNL FLD: ABNORMAL
DIFF PNL FLD: ABNORMAL
EPI CELLS # UR: PRESENT
EPI CELLS # UR: PRESENT
GLUCOSE UR QL: NEGATIVE MG/DL — SIGNIFICANT CHANGE UP
GLUCOSE UR QL: NEGATIVE MG/DL — SIGNIFICANT CHANGE UP
KETONES UR-MCNC: NEGATIVE MG/DL — SIGNIFICANT CHANGE UP
KETONES UR-MCNC: NEGATIVE MG/DL — SIGNIFICANT CHANGE UP
LEUKOCYTE ESTERASE UR-ACNC: NEGATIVE — SIGNIFICANT CHANGE UP
LEUKOCYTE ESTERASE UR-ACNC: NEGATIVE — SIGNIFICANT CHANGE UP
NITRITE UR-MCNC: NEGATIVE — SIGNIFICANT CHANGE UP
NITRITE UR-MCNC: NEGATIVE — SIGNIFICANT CHANGE UP
PH UR: 6.5 — SIGNIFICANT CHANGE UP (ref 5–8)
PH UR: 6.5 — SIGNIFICANT CHANGE UP (ref 5–8)
PROT UR-MCNC: NEGATIVE MG/DL — SIGNIFICANT CHANGE UP
PROT UR-MCNC: NEGATIVE MG/DL — SIGNIFICANT CHANGE UP
RBC CASTS # UR COMP ASSIST: 2 /HPF — SIGNIFICANT CHANGE UP (ref 0–4)
RBC CASTS # UR COMP ASSIST: 2 /HPF — SIGNIFICANT CHANGE UP (ref 0–4)
SP GR SPEC: 1.02 — SIGNIFICANT CHANGE UP (ref 1–1.03)
SP GR SPEC: 1.02 — SIGNIFICANT CHANGE UP (ref 1–1.03)
UROBILINOGEN FLD QL: 0.2 MG/DL — SIGNIFICANT CHANGE UP (ref 0.2–1)
UROBILINOGEN FLD QL: 0.2 MG/DL — SIGNIFICANT CHANGE UP (ref 0.2–1)
WBC UR QL: 10 /HPF — HIGH (ref 0–5)
WBC UR QL: 10 /HPF — HIGH (ref 0–5)

## 2023-11-17 PROCEDURE — G0463: CPT

## 2023-11-17 RX ORDER — SODIUM CHLORIDE 9 MG/ML
3 INJECTION INTRAMUSCULAR; INTRAVENOUS; SUBCUTANEOUS EVERY 8 HOURS
Refills: 0 | Status: DISCONTINUED | OUTPATIENT
Start: 2023-11-22 | End: 2023-12-06

## 2023-11-17 NOTE — H&P PST ADULT - NSICDXFAMILYHX_GEN_ALL_CORE_FT
FAMILY HISTORY:  Father  Still living? Unknown  No known problems, Age at diagnosis: Age Unknown    Mother  Still living? No  No known problems, Age at diagnosis: Age Unknown

## 2023-11-17 NOTE — H&P PST ADULT - PROBLEM SELECTOR PLAN 3
Scheduled for Robotic assisted laparoscopic cholecystectomy.      Pt instructed to be NPO the night before surgery and the morning of surgery except for meds with sip of water. Provided with chlorhexidene 4% solution to wash for 3 days including the morning of surgery. Written instructions provided and reviewed with . All questions answered . Tylenol to be used if needed prior to surgery.    Stop Bang =2

## 2023-11-17 NOTE — H&P PST ADULT - HISTORY OF PRESENT ILLNESS
72 year  female with history of HTN, hypothyroid,  presents with gallstones had CT in 7/25/2023 showed 1.6 cm gallstone. Pt referred to surgeon for evaluation. Pt stated she has pain after she eats with nausea no vomiting epigastric pain which refers to RU back. Pt scheduled for robotic assisted laparoscopic cholecystectomy on 11/22/2023. All information obtained using the .# [id number].

## 2023-11-17 NOTE — H&P PST ADULT - ASSESSMENT
72 year  female with history of HTN, hypothyroid,  presents with gallstones had CT in 7/25/2023 showed 1.6 cm gallstone. Pt referred to surgeon for evaluation. Pt stated she has pain after she eats with nausea no vomiting epigastric pain which refers to RU back. Pt scheduled for robotic assisted laparoscopic cholecystectomy on 11/22/2023. All information obtained using the .# [id number].

## 2023-11-17 NOTE — H&P PST ADULT - NSICDXPASTMEDICALHX_GEN_ALL_CORE_FT
PAST MEDICAL HISTORY:  Calculus of gallbladder without cholecystitis without obstruction     HTN (hypertension)     Hypothyroidism     Language barrier

## 2023-11-17 NOTE — H&P PST ADULT - PRIMARY CARE PROVIDER
Serum albumin level 2.8 on 6/7/2022    PLAN:  The patient, and/or any family member in attendance, is reminded that adequate and sufficient nutrition is essential to support good wound healing and the immune system.  In addition to a proper and balanced diet, I suggest the patient consume a minimum of two cans of Ensure or Boost daily.  In those patients with hepatic or renal dysfunction, they are directed to follow the advise of their gastroenterologist or nephrologist.  
normal S1, S2 heard
Dr Villafana [phone]

## 2023-11-21 ENCOUNTER — TRANSCRIPTION ENCOUNTER (OUTPATIENT)
Age: 73
End: 2023-11-21

## 2023-11-22 ENCOUNTER — TRANSCRIPTION ENCOUNTER (OUTPATIENT)
Age: 73
End: 2023-11-22

## 2023-11-22 ENCOUNTER — RESULT REVIEW (OUTPATIENT)
Age: 73
End: 2023-11-22

## 2023-11-22 ENCOUNTER — OUTPATIENT (OUTPATIENT)
Dept: OUTPATIENT SERVICES | Facility: HOSPITAL | Age: 73
LOS: 1 days | End: 2023-11-22
Payer: MEDICARE

## 2023-11-22 ENCOUNTER — APPOINTMENT (OUTPATIENT)
Dept: SURGERY | Facility: HOSPITAL | Age: 73
End: 2023-11-22

## 2023-11-22 VITALS
OXYGEN SATURATION: 97 % | TEMPERATURE: 98 F | RESPIRATION RATE: 16 BRPM | SYSTOLIC BLOOD PRESSURE: 149 MMHG | HEART RATE: 62 BPM | DIASTOLIC BLOOD PRESSURE: 73 MMHG

## 2023-11-22 VITALS
HEART RATE: 61 BPM | SYSTOLIC BLOOD PRESSURE: 165 MMHG | TEMPERATURE: 98 F | WEIGHT: 136.91 LBS | OXYGEN SATURATION: 99 % | DIASTOLIC BLOOD PRESSURE: 76 MMHG | RESPIRATION RATE: 16 BRPM | HEIGHT: 62 IN

## 2023-11-22 DIAGNOSIS — Z90.49 ACQUIRED ABSENCE OF OTHER SPECIFIED PARTS OF DIGESTIVE TRACT: Chronic | ICD-10-CM

## 2023-11-22 DIAGNOSIS — K80.20 CALCULUS OF GALLBLADDER WITHOUT CHOLECYSTITIS WITHOUT OBSTRUCTION: ICD-10-CM

## 2023-11-22 LAB
BLD GP AB SCN SERPL QL: SIGNIFICANT CHANGE UP
BLD GP AB SCN SERPL QL: SIGNIFICANT CHANGE UP

## 2023-11-22 PROCEDURE — 86901 BLOOD TYPING SEROLOGIC RH(D): CPT

## 2023-11-22 PROCEDURE — C9399: CPT

## 2023-11-22 PROCEDURE — 88304 TISSUE EXAM BY PATHOLOGIST: CPT

## 2023-11-22 PROCEDURE — 36415 COLL VENOUS BLD VENIPUNCTURE: CPT

## 2023-11-22 PROCEDURE — 86900 BLOOD TYPING SEROLOGIC ABO: CPT

## 2023-11-22 PROCEDURE — 47562 LAPAROSCOPIC CHOLECYSTECTOMY: CPT | Mod: AS

## 2023-11-22 PROCEDURE — 86850 RBC ANTIBODY SCREEN: CPT

## 2023-11-22 PROCEDURE — S2900 ROBOTIC SURGICAL SYSTEM: CPT | Mod: NC

## 2023-11-22 PROCEDURE — S2900: CPT

## 2023-11-22 PROCEDURE — 47562 LAPAROSCOPIC CHOLECYSTECTOMY: CPT

## 2023-11-22 PROCEDURE — 47563 LAPARO CHOLECYSTECTOMY/GRAPH: CPT

## 2023-11-22 PROCEDURE — C1889: CPT

## 2023-11-22 PROCEDURE — 88304 TISSUE EXAM BY PATHOLOGIST: CPT | Mod: 26

## 2023-11-22 DEVICE — LIGATING CLIPS WECK HEMOLOK POLYMER LARGE (PURPLE) 6: Type: IMPLANTABLE DEVICE | Status: FUNCTIONAL

## 2023-11-22 RX ORDER — FENTANYL CITRATE 50 UG/ML
25 INJECTION INTRAVENOUS
Refills: 0 | Status: DISCONTINUED | OUTPATIENT
Start: 2023-11-22 | End: 2023-11-22

## 2023-11-22 RX ORDER — FENTANYL CITRATE 50 UG/ML
50 INJECTION INTRAVENOUS
Refills: 0 | Status: DISCONTINUED | OUTPATIENT
Start: 2023-11-22 | End: 2023-11-22

## 2023-11-22 RX ORDER — LEVOTHYROXINE SODIUM 125 MCG
1 TABLET ORAL
Refills: 0 | DISCHARGE

## 2023-11-22 RX ORDER — SODIUM CHLORIDE 9 MG/ML
1000 INJECTION, SOLUTION INTRAVENOUS
Refills: 0 | Status: DISCONTINUED | OUTPATIENT
Start: 2023-11-22 | End: 2023-11-22

## 2023-11-22 RX ORDER — ONDANSETRON 8 MG/1
4 TABLET, FILM COATED ORAL ONCE
Refills: 0 | Status: DISCONTINUED | OUTPATIENT
Start: 2023-11-22 | End: 2023-11-22

## 2023-11-22 RX ORDER — OXYCODONE HYDROCHLORIDE 5 MG/1
5 TABLET ORAL ONCE
Refills: 0 | Status: DISCONTINUED | OUTPATIENT
Start: 2023-11-22 | End: 2023-11-22

## 2023-11-22 RX ORDER — OXYCODONE HYDROCHLORIDE 5 MG/1
1 TABLET ORAL
Qty: 6 | Refills: 0
Start: 2023-11-22

## 2023-11-22 RX ORDER — INDOCYANINE GREEN 25 MG
5 KIT INTRAVASCULAR; INTRAVENOUS ONCE
Refills: 0 | Status: COMPLETED | OUTPATIENT
Start: 2023-11-22 | End: 2023-11-22

## 2023-11-22 RX ORDER — ATENOLOL 25 MG/1
1 TABLET ORAL
Refills: 0 | DISCHARGE

## 2023-11-22 RX ADMIN — INDOCYANINE GREEN 5 MILLIGRAM(S): KIT INTRAVASCULAR; INTRAVENOUS at 10:37

## 2023-11-22 RX ADMIN — FENTANYL CITRATE 25 MICROGRAM(S): 50 INJECTION INTRAVENOUS at 15:02

## 2023-11-22 NOTE — ASU DISCHARGE PLAN (ADULT/PEDIATRIC) - CARE PROVIDER_API CALL
Constantino aHrris.  Surgery  [address]  [place] [address]  Phone: [phone]  Fax: [phone]  Follow Up Time:

## 2023-11-27 PROBLEM — K80.20 CALCULUS OF GALLBLADDER WITHOUT CHOLECYSTITIS WITHOUT OBSTRUCTION: Chronic | Status: ACTIVE | Noted: 2023-11-17

## 2023-11-27 PROBLEM — Z78.9 OTHER SPECIFIED HEALTH STATUS: Chronic | Status: ACTIVE | Noted: 2023-11-17

## 2023-11-28 LAB
SURGICAL PATHOLOGY STUDY: SIGNIFICANT CHANGE UP
SURGICAL PATHOLOGY STUDY: SIGNIFICANT CHANGE UP

## 2023-12-07 ENCOUNTER — APPOINTMENT (OUTPATIENT)
Dept: SURGERY | Facility: CLINIC | Age: 73
End: 2023-12-07
Payer: MEDICARE

## 2023-12-07 PROCEDURE — 99024 POSTOP FOLLOW-UP VISIT: CPT

## 2024-02-29 NOTE — BRIEF OPERATIVE NOTE - NSICDXBRIEFPOSTOP_GEN_ALL_CORE_FT
Diet controlled  BP acceptable   POST-OP DIAGNOSIS:  Cholecystitis, unspecified 22-Nov-2023 14:17:31  Ingris Mehta

## 2024-03-18 NOTE — ED PROVIDER NOTE - CARDIOVASCULAR NEGATIVE STATEMENT, MLM
Patient seen in office today for 8wk post op LT Tibia IM Nail DOS 1/17/24. X-rays taken. Stated she is weight bearing at home without boot. 4/10 pain level. Does have some numbness in knee cap. Denies numbness/tingling in foot. Has had one PT session so far and stated having a slight increase of pain after.   
and extension as well as ankle flexion and extension.  Sensation and motor functions intact throughout.  Improved mild tenderness at the healing fracture site.  Strength is 5 out of 5 with flexion extension of the ankle and knee.    Diagnostic testing:  X-rays reviewed in office, I independently reviewed the films in the office today:   2 views of the tibia and fibula show excellent alignment of the tibia status post intramedullary nailing with stable alignment of the hardware.  There is progression of the healing process across the fracture site with bridging bone and healing callus formation.     Normal alignment of the knee and ankle joints.     Impression: Healing midshaft tibia fracture status post intramedullary nailing      Office Procedures:  No orders of the defined types were placed in this encounter.      Assessment and Plan  1.  Status post left tibia intramedullary nailing    Her x-rays show excellent healing and alignment.    I recommend that we advance her activities and she can discontinue use of the boot and use it only as comfort for protection.    I recommend that she advance her activities well with physical therapy and continue to work on strengthening and range of motion of the leg and knee and ankle.    I have given her a note to be able to return to work with restrictions of work from home for the next month.    Follow-up here in 1 month for repeat x-rays and check of her progress with therapy.  We will discuss moving forward with return to possible regular job duties at that time if she is doing well.        Electronically signed by Rodney Sage MD on 3/18/2024 at 10:19 AM       
no chest pain and no edema.

## 2024-05-08 ENCOUNTER — APPOINTMENT (OUTPATIENT)
Dept: MAMMOGRAPHY | Facility: CLINIC | Age: 74
End: 2024-05-08
Payer: MEDICARE

## 2024-05-08 PROCEDURE — 77067 SCR MAMMO BI INCL CAD: CPT

## 2024-05-08 PROCEDURE — 77063 BREAST TOMOSYNTHESIS BI: CPT

## 2024-05-10 ENCOUNTER — APPOINTMENT (OUTPATIENT)
Dept: OBGYN | Facility: CLINIC | Age: 74
End: 2024-05-10
Payer: MEDICARE

## 2024-05-10 ENCOUNTER — OUTPATIENT (OUTPATIENT)
Dept: OUTPATIENT SERVICES | Facility: HOSPITAL | Age: 74
LOS: 1 days | End: 2024-05-10
Payer: MEDICARE

## 2024-05-10 DIAGNOSIS — N31.9 NEUROMUSCULAR DYSFUNCTION OF BLADDER, UNSPECIFIED: ICD-10-CM

## 2024-05-10 DIAGNOSIS — Z01.419 ENCOUNTER FOR GYNECOLOGICAL EXAMINATION (GENERAL) (ROUTINE) W/OUT ABNORMAL FINDINGS: ICD-10-CM

## 2024-05-10 DIAGNOSIS — Z12.39 ENCOUNTER FOR OTHER SCREENING FOR MALIGNANT NEOPLASM OF BREAST: ICD-10-CM

## 2024-05-10 DIAGNOSIS — Z86.79 PERSONAL HISTORY OF OTHER DISEASES OF THE CIRCULATORY SYSTEM: ICD-10-CM

## 2024-05-10 DIAGNOSIS — Z78.9 OTHER SPECIFIED HEALTH STATUS: ICD-10-CM

## 2024-05-10 DIAGNOSIS — Z12.4 ENCOUNTER FOR SCREENING FOR MALIGNANT NEOPLASM OF CERVIX: ICD-10-CM

## 2024-05-10 DIAGNOSIS — Z86.39 PERSONAL HISTORY OF OTHER ENDOCRINE, NUTRITIONAL AND METABOLIC DISEASE: ICD-10-CM

## 2024-05-10 DIAGNOSIS — Z00.00 ENCOUNTER FOR GENERAL ADULT MEDICAL EXAMINATION WITHOUT ABNORMAL FINDINGS: ICD-10-CM

## 2024-05-10 PROCEDURE — 87624 HPV HI-RISK TYP POOLED RSLT: CPT

## 2024-05-10 PROCEDURE — 87591 N.GONORRHOEAE DNA AMP PROB: CPT

## 2024-05-10 PROCEDURE — 87491 CHLMYD TRACH DNA AMP PROBE: CPT

## 2024-05-10 PROCEDURE — 87481 CANDIDA DNA AMP PROBE: CPT

## 2024-05-10 PROCEDURE — 81513 NFCT DS BV RNA VAG FLU ALG: CPT

## 2024-05-10 PROCEDURE — 99204 OFFICE O/P NEW MOD 45 MIN: CPT

## 2024-05-10 PROCEDURE — 87661 TRICHOMONAS VAGINALIS AMPLIF: CPT

## 2024-05-10 PROCEDURE — G0463: CPT

## 2024-05-10 NOTE — COUNSELING
[Nutrition/ Exercise/ Weight Management] : nutrition, exercise, weight management [Body Image] : body image [Vitamins/Supplements] : vitamins/supplements [Bladder Hygiene] : bladder hygiene [STD (testing, results, tx)] : STD (testing, results, tx)

## 2024-05-10 NOTE — PLAN
[FreeTextEntry1] : Annual Gyn exam - Pap/HPV/STD; Vaginitis - Aptima sent; Bladder dysfunction - Urogynecology consult given.   -I spent a total of 50 minutes on date of the encounter reviewing patient's medical / surgical and Ob/Gyn history, evaluating, counseling and treating the patient.

## 2024-05-10 NOTE — HISTORY OF PRESENT ILLNESS
[Patient reported PAP Smear was normal] : Patient reported PAP Smear was normal [Post-Menopause, No Sxs] : post-menopausal, currently without symptoms [FreeTextEntry1] : Annual Gyn exam    ~ 20 years, denies hx/o postmenopausal bleeding.   Last Pap> 2 years ago, requesting Pap today, no records available of previous Pap, will obtain today.    Last Mammogram (24) WNL   / s/p  x 3  Complaining of bladder dysfunction, worsening symptoms for past 6 months, requesting consultation with Urogynecologist.  Sexually active, living with partner x 18 years.    [TextBox_4] : leaking urine vaginal itching (outside only) [Mammogramdate] : 5/9/24 [PapSmeardate] : 2023 [TextBox_31] : per patient [PGHxTotal] : 5 [[facility]] : 3 [PGHxPremature] : 0 [PGHxAbortions] : 2 [[facility] Center[facility]] : 3 [PGHxABInduced] : 2 [PGHxABSpont] : 0 [PGHxEctopic] : 0 [PGHxMultBirths] : 0 [Menopause Age: ____] : age at menopause was [unfilled] [No] : Patient does not have concerns regarding sex [Currently Active] : currently active [Men] : men [Vaginal] : vaginal

## 2024-05-10 NOTE — PHYSICAL EXAM
[Chaperone Present] : A chaperone was present in the examining room during all aspects of the physical examination [82421] : A chaperone was present during the pelvic exam. [Appropriately responsive] : appropriately responsive [Alert] : alert [No Acute Distress] : no acute distress [No Lymphadenopathy] : no lymphadenopathy [Soft] : soft [Non-tender] : non-tender [Non-distended] : non-distended [No HSM] : No HSM [No Lesions] : no lesions [No Mass] : no mass [Oriented x3] : oriented x3 [Examination Of The Breasts] : a normal appearance [No Masses] : no breast masses were palpable [Labia Majora] : normal [Labia Minora] : normal [Cystocele] : a cystocele [Rectocele] : a rectocele [Discharge] : a  ~M vaginal discharge was present [Moderate] : moderate [White] : white [Thick] : thick [No Bleeding] : There was no active vaginal bleeding [Normal] : normal

## 2024-05-13 DIAGNOSIS — Z86.39 PERSONAL HISTORY OF OTHER ENDOCRINE, NUTRITIONAL AND METABOLIC DISEASE: ICD-10-CM

## 2024-05-13 DIAGNOSIS — Z12.4 ENCOUNTER FOR SCREENING FOR MALIGNANT NEOPLASM OF CERVIX: ICD-10-CM

## 2024-05-13 DIAGNOSIS — Z12.39 ENCOUNTER FOR OTHER SCREENING FOR MALIGNANT NEOPLASM OF BREAST: ICD-10-CM

## 2024-05-13 DIAGNOSIS — N31.9 NEUROMUSCULAR DYSFUNCTION OF BLADDER, UNSPECIFIED: ICD-10-CM

## 2024-05-13 DIAGNOSIS — Z86.79 PERSONAL HISTORY OF OTHER DISEASES OF THE CIRCULATORY SYSTEM: ICD-10-CM

## 2024-05-13 DIAGNOSIS — Z01.419 ENCOUNTER FOR GYNECOLOGICAL EXAMINATION (GENERAL) (ROUTINE) WITHOUT ABNORMAL FINDINGS: ICD-10-CM

## 2024-05-13 DIAGNOSIS — N95.2 POSTMENOPAUSAL ATROPHIC VAGINITIS: ICD-10-CM

## 2024-05-13 DIAGNOSIS — Z78.9 OTHER SPECIFIED HEALTH STATUS: ICD-10-CM

## 2024-05-14 LAB
BV BACTERIA RRNA VAG QL NAA+PROBE: DETECTED
C GLABRATA RNA VAG QL NAA+PROBE: NOT DETECTED
C TRACH RRNA SPEC QL NAA+PROBE: NOT DETECTED
CANDIDA RRNA VAG QL PROBE: DETECTED
HPV HIGH+LOW RISK DNA PNL CVX: NOT DETECTED
N GONORRHOEA RRNA SPEC QL NAA+PROBE: NOT DETECTED
T VAGINALIS RRNA SPEC QL NAA+PROBE: NOT DETECTED

## 2024-05-15 LAB — CYTOLOGY CVX/VAG DOC THIN PREP: ABNORMAL

## 2024-05-24 NOTE — PROCEDURE
[Straight Catheterization] : insertion of a straight catheter [Urinary Tract Infection] : a urinary tract infection [___ Fr Straight Tip] : a [unfilled] in [place] straight tip catheter [None] : there were no complications with the catheter insertion [Clear] : clear [Culture] : culture

## 2024-05-28 ENCOUNTER — APPOINTMENT (OUTPATIENT)
Dept: UROGYNECOLOGY | Facility: CLINIC | Age: 74
End: 2024-05-28
Payer: MEDICARE

## 2024-05-28 VITALS
OXYGEN SATURATION: 97 % | BODY MASS INDEX: 28.27 KG/M2 | HEIGHT: 60 IN | HEART RATE: 57 BPM | SYSTOLIC BLOOD PRESSURE: 143 MMHG | WEIGHT: 144 LBS | TEMPERATURE: 97.7 F | DIASTOLIC BLOOD PRESSURE: 81 MMHG

## 2024-05-28 DIAGNOSIS — N95.2 POSTMENOPAUSAL ATROPHIC VAGINITIS: ICD-10-CM

## 2024-05-28 PROCEDURE — 99204 OFFICE O/P NEW MOD 45 MIN: CPT | Mod: 25

## 2024-05-28 PROCEDURE — 99459 PELVIC EXAMINATION: CPT

## 2024-05-28 PROCEDURE — 51701 INSERT BLADDER CATHETER: CPT

## 2024-05-28 RX ORDER — FLUCONAZOLE 150 MG/1
150 TABLET ORAL
Qty: 2 | Refills: 1 | Status: ACTIVE | COMMUNITY
Start: 2024-05-28 | End: 1900-01-01

## 2024-05-28 RX ORDER — LEVOTHYROXINE SODIUM 0.07 MG/1
75 TABLET ORAL
Refills: 0 | Status: ACTIVE | COMMUNITY

## 2024-05-28 NOTE — PHYSICAL EXAM
[Chaperone Present] : A chaperone was present in the examining room during all aspects of the physical examination [Labia Majora] : were normal [Labia Minora] : were normal [Normal Appearance] : general appearance was normal [Atrophy] : atrophy [No Bleeding] : there was no active vaginal bleeding [Normal] : no abnormalities [Exam Deferred] : was deferred [92002] : A chaperone was present during the pelvic exam. [FreeTextEntry2] : Genevieve [FreeTextEntry1] : General: Well, appearing. Alert and orientated. No acute distress HEENT: Normocephalic, atraumatic and extraocular muscles appear to be intact Neck: Full range of motion, no obvious lymphadenopathy, deformities, or masses noted Respiratory: Speaking in full sentences comfortably, normal work of breathing and no cough during visit Musculoskeletal: full range of motion  Extremities: No upper extremity edema noted Skin: no obvious rash or skin lesions Neuro: Orientated X 3, speech is fluent, normal rate Psych: Normal mood and affect [Tenderness] : ~T no ~M abdominal tenderness observed [Distended] : not distended [Scar] : a scar was noted [3] : 3 [FreeTextEntry3] : (+) hypermobility, (-) cough stress test  [de-identified] : No prolapse

## 2024-05-28 NOTE — REASON FOR VISIT
[Initial Visit ___] : an initial visit for [unfilled] [[employer] ] : provided by [employer]   [Interpreters_IDNumber] : [id number] [Interpreters_FullName] : Tamiko [TWNoteComboBox1] : Cypriot [Urinary Incontinence] : urinary incontinence

## 2024-05-28 NOTE — HISTORY OF PRESENT ILLNESS
[Vaginal Wall Prolapse] : no [Rectal Prolapse] : no [Unable To Restrain Bowel Movement] : no [Urinary Tract Infection] : no [x2] : nocturia two times a night [Urinary Frequency] : no [Feelings Of Urinary Urgency] : no [Pain During Urination (Dysuria)] : no [Constipation Obstructed Defecation] : no [Stool Visible Blood] : no [Incomplete Emptying Of Stool] : no [Pelvic Pain] : no [Vaginal Pain] : no [Rectal Pain] : no [de-identified] : sometimes [de-identified] : 4-5x/d [de-identified] : sometimes [FreeTextEntry6] : BM 2x/d  [FreeTextEntry1] : Muna is a 74yo who presents with leakage of urine with laughing, coughing, sneezing. Her symptoms have been worsening over the last few months. She has daytime frequency of 4-5x/d, nocturia 2x/n, and occasional urgency if she drinks a lot of water.   PMH: HTN, HLD. Denies history of glaucoma   PSH: Appendectomy, LSC cholecystectomy Soc: Never smoker All: NKDA  Daily fluid intake: 2-3 glasses water + 1c coffee + 1c tea. Last drink at 7pm, goes to sleep at 11pm

## 2024-05-28 NOTE — DISCUSSION/SUMMARY
[FreeTextEntry1] : We reviewed her urinary symptoms and exam findings and discussed possible etiologies including urinary tract infection and stress incontinence. Cath urine specimen was sent for urine culture to rule out urinary tract infection. Images were used to demonstrate her incontinence and treatment options. We reviewed management options for stress urinary incontinence including: observation, pelvic floor exercises with or without a physical therapist, continence devices or pessaries, periurethral bulking agents, and surgical management. We discussed surgical management options including a midurethral sling, fermin colposuspension, and pubovaginal sling using native tissue. We reviewed risks and benefits of each procedure. If interested in surgery, we discussed obtaining urodynamics. We also discussed the perioperative course and reviewed postoperative restrictions of complete pelvic rest and avoidance of strenuous exercise/heavy lifting (>10lb) for 6 weeks.   At this time, she is considering surgical intervention with a sling. We will obtain urodynamics. RTO UDS and then RPA after

## 2024-05-28 NOTE — LETTER BODY
[Dear  ___] : Dear  [unfilled], [I had the pleasure of evaluating your patient, [unfilled]. Thank you for referring Ms. [unfilled] for consultation for ___] : I had the pleasure of evaluating your patient, [unfilled]. Thank you for referring Ms. [unfilled] for consultation for [unfilled]. [Attached please find my note.] : Attached please find my note. [Thank you very much for allowing me to participate in the care of this patient. If you have any questions, please do not hesitate to contact me] : Thank you very much for allowing me to participate in the care of this patient. If you have any questions, please do not hesitate to contact me. [FreeTextEntry1] : stress incontinence

## 2024-05-31 DIAGNOSIS — N39.0 URINARY TRACT INFECTION, SITE NOT SPECIFIED: ICD-10-CM

## 2024-05-31 LAB — BACTERIA UR CULT: ABNORMAL

## 2024-05-31 RX ORDER — NITROFURANTOIN (MONOHYDRATE/MACROCRYSTALS) 25; 75 MG/1; MG/1
100 CAPSULE ORAL
Qty: 10 | Refills: 0 | Status: ACTIVE | COMMUNITY
Start: 2024-05-31 | End: 1900-01-01

## 2024-06-06 ENCOUNTER — APPOINTMENT (OUTPATIENT)
Dept: UROGYNECOLOGY | Facility: CLINIC | Age: 74
End: 2024-06-06
Payer: MEDICARE

## 2024-06-06 VITALS
WEIGHT: 144 LBS | HEART RATE: 59 BPM | DIASTOLIC BLOOD PRESSURE: 88 MMHG | SYSTOLIC BLOOD PRESSURE: 146 MMHG | HEIGHT: 60 IN | BODY MASS INDEX: 28.27 KG/M2 | OXYGEN SATURATION: 97 % | TEMPERATURE: 97.8 F

## 2024-06-06 LAB
BILIRUB UR QL STRIP: NEGATIVE
CLARITY UR: CLEAR
COLLECTION METHOD: NORMAL
GLUCOSE UR-MCNC: NEGATIVE
HCG UR QL: 0.2 EU/DL
HGB UR QL STRIP.AUTO: NORMAL
KETONES UR-MCNC: NEGATIVE
LEUKOCYTE ESTERASE UR QL STRIP: NEGATIVE
NITRITE UR QL STRIP: NEGATIVE
PH UR STRIP: 6.5
PROT UR STRIP-MCNC: NEGATIVE
SP GR UR STRIP: 1.02

## 2024-06-06 PROCEDURE — 51797 INTRAABDOMINAL PRESSURE TEST: CPT

## 2024-06-06 PROCEDURE — 51729 CYSTOMETROGRAM W/VP&UP: CPT

## 2024-06-06 PROCEDURE — 51784 ANAL/URINARY MUSCLE STUDY: CPT

## 2024-06-06 PROCEDURE — 81003 URINALYSIS AUTO W/O SCOPE: CPT | Mod: QW

## 2024-06-07 LAB
APPEARANCE: CLEAR
BACTERIA: NEGATIVE /HPF
BILIRUBIN URINE: NEGATIVE
BLOOD URINE: ABNORMAL
CAST: NORMAL /LPF
COLOR: YELLOW
EPITHELIAL CELLS: 1 /HPF
GLUCOSE QUALITATIVE U: NEGATIVE MG/DL
KETONES URINE: NEGATIVE MG/DL
LEUKOCYTE ESTERASE URINE: NEGATIVE
MICROSCOPIC-UA: NORMAL
NITRITE URINE: NEGATIVE
PH URINE: 6.5
PROTEIN URINE: NEGATIVE MG/DL
RED BLOOD CELLS URINE: 2 /HPF
REVIEW: NORMAL
SPECIFIC GRAVITY URINE: 1.02
UROBILINOGEN URINE: 0.2 MG/DL
WHITE BLOOD CELLS URINE: 0 /HPF

## 2024-06-10 LAB — BACTERIA UR CULT: NORMAL

## 2024-06-18 ENCOUNTER — APPOINTMENT (OUTPATIENT)
Dept: ULTRASOUND IMAGING | Facility: CLINIC | Age: 74
End: 2024-06-18
Payer: MEDICARE

## 2024-06-18 ENCOUNTER — APPOINTMENT (OUTPATIENT)
Dept: MAMMOGRAPHY | Facility: CLINIC | Age: 74
End: 2024-06-18
Payer: MEDICARE

## 2024-06-18 PROCEDURE — 77065 DX MAMMO INCL CAD UNI: CPT | Mod: RT

## 2024-06-18 PROCEDURE — G0279: CPT | Mod: RT

## 2024-06-18 PROCEDURE — 76642 ULTRASOUND BREAST LIMITED: CPT | Mod: RT

## 2024-06-20 ENCOUNTER — APPOINTMENT (OUTPATIENT)
Dept: UROGYNECOLOGY | Facility: CLINIC | Age: 74
End: 2024-06-20
Payer: MEDICARE

## 2024-06-20 VITALS
BODY MASS INDEX: 28.47 KG/M2 | TEMPERATURE: 97.9 F | WEIGHT: 145 LBS | HEIGHT: 60 IN | HEART RATE: 58 BPM | SYSTOLIC BLOOD PRESSURE: 142 MMHG | DIASTOLIC BLOOD PRESSURE: 70 MMHG | OXYGEN SATURATION: 97 %

## 2024-06-20 DIAGNOSIS — R35.1 NOCTURIA: ICD-10-CM

## 2024-06-20 DIAGNOSIS — N39.3 STRESS INCONTINENCE (FEMALE) (MALE): ICD-10-CM

## 2024-06-20 DIAGNOSIS — R39.15 URGENCY OF URINATION: ICD-10-CM

## 2024-06-20 PROCEDURE — 99213 OFFICE O/P EST LOW 20 MIN: CPT

## 2024-06-20 NOTE — DISCUSSION/SUMMARY
[FreeTextEntry1] : We reviewed her urodynamics findings of stress incontinence. Images were used to demonstrate her incontinence and treatment options. We reviewed management options for stress urinary incontinence including: observation, pelvic floor exercises with or without a physical therapist, continence devices or pessaries, periurethral bulking agents, and surgical management. With periurethral bulking, we discussed that it can be done in the office with local anesthesia; however, it may not be as effective or last as long as surgical intervention. It also carries a risk of urinary tract infection and possible need for temporary catheterization if she is unable to void afterwards. We discussed surgical management options including a midurethral sling, fermin colposuspension, and pubovaginal sling using native tissue. We reviewed risks and benefits of each procedure. We also discussed the perioperative course and reviewed postoperative restrictions of complete pelvic rest and avoidance of strenuous exercise/heavy lifting (>10lb) for 6 weeks.   At this time, she is considering urethral bulking vs a midurethral sling. She would like to talk to her  and her doctor before deciding. We discussed that if she decides on urethral bulking, we would need to give her a prophylactic antibiotic to start a few days before her procedure. If she decides on a midurethral sling, she would need medical clearance since it is a surgery and would require anesthesia.   She will call once she has decided.

## 2024-06-20 NOTE — PHYSICAL EXAM
[FreeTextEntry1] : General: Well, appearing. Alert and orientated. No acute distress HEENT: Normocephalic, atraumatic and extraocular muscles appear to be intact Neck: Full range of motion, no obvious lymphadenopathy, deformities, or masses noted Respiratory: Speaking in full sentences comfortably, normal work of breathing and no cough during visit Musculoskeletal: full range of motion  Extremities: No upper extremity edema noted Skin: no obvious rash or skin lesions Neuro: Orientated X 3, speech is fluent, normal rate Psych: Normal mood and affect

## 2024-06-20 NOTE — REASON FOR VISIT
[Follow-Up Visit_____] : a follow-up visit for [unfilled] [[employer] ] : provided by [employer]   [Interpreters_IDNumber] : [id number] [Interpreters_FullName] : Edgar [TWNoteComboBox1] : Chinese

## 2024-06-20 NOTE — HISTORY OF PRESENT ILLNESS
[Vaginal Wall Prolapse] : no [Rectal Prolapse] : no [Unable To Restrain Bowel Movement] : no [Urinary Tract Infection] : no [x2] : nocturia two times a night [Urinary Frequency] : no [Feelings Of Urinary Urgency] : no [Pain During Urination (Dysuria)] : no [Constipation Obstructed Defecation] : no [Stool Visible Blood] : no [Incomplete Emptying Of Stool] : no [Pelvic Pain] : no [Vaginal Pain] : no [Rectal Pain] : no [de-identified] : sometimes [de-identified] : 4-5x/d [de-identified] : none since last visit  [FreeTextEntry6] : BM 2x/d  [FreeTextEntry1] : Muna is a 72yo with stress incontinence She underwent urodynamics that showed stress incontinence, no ISD. There was no detrusor overactivity, and she had a normal PVR and MCFP of 475ml. She had some urgency at her first visit; however, she had a UTI. Her urgency has since resolved.    PMH: HTN, HLD. Denies history of glaucoma   PSH: Appendectomy, LSC cholecystectomy Soc: Never smoker All: NKDA  Daily fluid intake: 2-3 glasses water + 1c coffee + 1c tea. Last drink at 7pm, goes to sleep at 11pm

## 2024-07-01 ENCOUNTER — APPOINTMENT (OUTPATIENT)
Dept: UROLOGY | Facility: CLINIC | Age: 74
End: 2024-07-01
Payer: MEDICARE

## 2024-07-01 VITALS
HEIGHT: 60 IN | OXYGEN SATURATION: 96 % | WEIGHT: 145 LBS | HEART RATE: 66 BPM | TEMPERATURE: 97.3 F | SYSTOLIC BLOOD PRESSURE: 123 MMHG | BODY MASS INDEX: 28.47 KG/M2 | DIASTOLIC BLOOD PRESSURE: 62 MMHG

## 2024-07-01 DIAGNOSIS — R31.29 OTHER MICROSCOPIC HEMATURIA: ICD-10-CM

## 2024-07-01 DIAGNOSIS — N39.0 URINARY TRACT INFECTION, SITE NOT SPECIFIED: ICD-10-CM

## 2024-07-01 PROCEDURE — 99204 OFFICE O/P NEW MOD 45 MIN: CPT

## 2024-07-01 PROCEDURE — G2211 COMPLEX E/M VISIT ADD ON: CPT

## 2024-07-02 LAB
APPEARANCE: CLEAR
BACTERIA: ABNORMAL /HPF
BILIRUBIN URINE: NEGATIVE
BLOOD URINE: ABNORMAL
CALCIUM OXALATE CRYSTALS: PRESENT
CAST: 1 /LPF
COLOR: NORMAL
EPITHELIAL CELLS: 15 /HPF
GLUCOSE QUALITATIVE U: NEGATIVE MG/DL
HYALINE CASTS: PRESENT
KETONES URINE: ABNORMAL MG/DL
LEUKOCYTE ESTERASE URINE: ABNORMAL
MICROSCOPIC-UA: NORMAL
MUCUS: PRESENT
NITRITE URINE: NEGATIVE
PH URINE: 6
PROTEIN URINE: NORMAL MG/DL
RED BLOOD CELLS URINE: 34 /HPF
REVIEW: NORMAL
SPECIFIC GRAVITY URINE: 1.03
UROBILINOGEN URINE: 0.2 MG/DL
WHITE BLOOD CELLS URINE: 2 /HPF

## 2024-07-03 LAB — BACTERIA UR CULT: NORMAL

## 2024-07-08 ENCOUNTER — APPOINTMENT (OUTPATIENT)
Dept: CT IMAGING | Facility: CLINIC | Age: 74
End: 2024-07-08

## 2024-07-08 PROCEDURE — 74178 CT ABD&PLV WO CNTR FLWD CNTR: CPT

## 2024-07-16 NOTE — H&P ADULT - HISTORY OF PRESENT ILLNESS
Patient here to see ENT for acute otitis media  Nicki Carter LPN ..........7/16/2024 2:03 PM    Patient is a 71 y/o F with PMH HTN and hypothyroidism who was called back to the ED today for positive blood cultures. Patient came to the ED on 07/25/2023 with complains of fevers/chills, right flank pain, dysuria, increased urinary frequency and urgency. Patient tested positive for urinary tract infection and was discharged with cefuroxime 250mg BID. Patient reports that since starting antibiotic therapy she feels significantly better. Patient reports she still has generalized weakness, fatigue, back pain, and soreness in her legs. However, her complains of fever, dysuria and urinary frequency have improved. Patient reports she still does not feel at her baseline. Patient denies any lightheadedness, dizziness, chest pain, palpitations, shortness of breath, cough, nausea, vomiting, diarrhea, constipation, melena, or hematochezia. Patient denies any other complains at this time.

## 2024-07-29 ENCOUNTER — APPOINTMENT (OUTPATIENT)
Dept: UROLOGY | Facility: CLINIC | Age: 74
End: 2024-07-29
Payer: MEDICARE

## 2024-07-29 VITALS
TEMPERATURE: 96.8 F | HEART RATE: 65 BPM | OXYGEN SATURATION: 96 % | DIASTOLIC BLOOD PRESSURE: 77 MMHG | SYSTOLIC BLOOD PRESSURE: 148 MMHG

## 2024-07-29 DIAGNOSIS — R31.29 OTHER MICROSCOPIC HEMATURIA: ICD-10-CM

## 2024-07-29 PROCEDURE — 52000 CYSTOURETHROSCOPY: CPT

## 2024-07-29 PROCEDURE — 99213 OFFICE O/P EST LOW 20 MIN: CPT | Mod: 25

## 2024-07-29 NOTE — ASSESSMENT
[FreeTextEntry1] : 74 yo female with recurrent UTI with 2 over the past 6 months. States this is her first UTIs in some time. She also has history of microscopic hematuria that has not been worked up. CT urogram images reviewed and discussed which were negative. Cystoscopy done today negative  Plan Urinalysis reviewed: + microscopic hematuria Urine culture reviewed: negative I have reviewed images of patient's CT urogram which were negative for hydro stones or masses Cystoscopy done today negative.    Patient is being seen today for evaluation and management of a chronic and longitudinal ongoing condition and I am of the primary treating physician.

## 2024-07-29 NOTE — HISTORY OF PRESENT ILLNESS
[FreeTextEntry1] : 74 yo female with history of UTI. She has had 2 UTIs over the past 6 months. States these were her first UTIs.   She has had multiple urinalysis in the past demonstrating microscopic hematuria. However, she has not undergone any workup for this.   She has been evaluated for WOODROW with Dr. Jones.   We reviewed her CT Urogram which was negative for stones hydro or masses  Cystoscopy done today negative

## 2024-12-20 ENCOUNTER — APPOINTMENT (OUTPATIENT)
Dept: ULTRASOUND IMAGING | Facility: CLINIC | Age: 74
End: 2024-12-20
Payer: MEDICARE

## 2024-12-20 PROCEDURE — 76642 ULTRASOUND BREAST LIMITED: CPT | Mod: RT

## 2025-02-26 ENCOUNTER — APPOINTMENT (OUTPATIENT)
Dept: OTOLARYNGOLOGY | Facility: CLINIC | Age: 75
End: 2025-02-26

## 2025-03-05 NOTE — ED PROVIDER NOTE - BIRTH SEX
Female

## 2025-04-11 ENCOUNTER — EMERGENCY (EMERGENCY)
Facility: HOSPITAL | Age: 75
LOS: 1 days | End: 2025-04-11
Attending: EMERGENCY MEDICINE
Payer: MEDICARE

## 2025-04-11 VITALS
OXYGEN SATURATION: 98 % | TEMPERATURE: 98 F | SYSTOLIC BLOOD PRESSURE: 129 MMHG | DIASTOLIC BLOOD PRESSURE: 69 MMHG | RESPIRATION RATE: 17 BRPM | HEART RATE: 76 BPM | WEIGHT: 134.48 LBS

## 2025-04-11 DIAGNOSIS — Z90.49 ACQUIRED ABSENCE OF OTHER SPECIFIED PARTS OF DIGESTIVE TRACT: Chronic | ICD-10-CM

## 2025-04-11 LAB
ALBUMIN SERPL ELPH-MCNC: 3.8 G/DL — SIGNIFICANT CHANGE UP (ref 3.5–5)
ALP SERPL-CCNC: 89 U/L — SIGNIFICANT CHANGE UP (ref 40–120)
ALT FLD-CCNC: 28 U/L DA — SIGNIFICANT CHANGE UP (ref 10–60)
ANION GAP SERPL CALC-SCNC: 1 MMOL/L — LOW (ref 5–17)
APPEARANCE UR: CLEAR — SIGNIFICANT CHANGE UP
AST SERPL-CCNC: 16 U/L — SIGNIFICANT CHANGE UP (ref 10–40)
BASOPHILS # BLD AUTO: 0.04 K/UL — SIGNIFICANT CHANGE UP (ref 0–0.2)
BASOPHILS NFR BLD AUTO: 0.4 % — SIGNIFICANT CHANGE UP (ref 0–2)
BILIRUB SERPL-MCNC: 0.4 MG/DL — SIGNIFICANT CHANGE UP (ref 0.2–1.2)
BILIRUB UR-MCNC: NEGATIVE — SIGNIFICANT CHANGE UP
BUN SERPL-MCNC: 13 MG/DL — SIGNIFICANT CHANGE UP (ref 7–18)
CALCIUM SERPL-MCNC: 9.3 MG/DL — SIGNIFICANT CHANGE UP (ref 8.4–10.5)
CHLORIDE SERPL-SCNC: 109 MMOL/L — HIGH (ref 96–108)
CO2 SERPL-SCNC: 30 MMOL/L — SIGNIFICANT CHANGE UP (ref 22–31)
COLOR SPEC: YELLOW — SIGNIFICANT CHANGE UP
CREAT SERPL-MCNC: 0.91 MG/DL — SIGNIFICANT CHANGE UP (ref 0.5–1.3)
DIFF PNL FLD: ABNORMAL
EGFR: 67 ML/MIN/1.73M2 — SIGNIFICANT CHANGE UP
EGFR: 67 ML/MIN/1.73M2 — SIGNIFICANT CHANGE UP
EOSINOPHIL # BLD AUTO: 0.2 K/UL — SIGNIFICANT CHANGE UP (ref 0–0.5)
EOSINOPHIL NFR BLD AUTO: 2 % — SIGNIFICANT CHANGE UP (ref 0–6)
FLUAV AG NPH QL: SIGNIFICANT CHANGE UP
FLUBV AG NPH QL: SIGNIFICANT CHANGE UP
GLUCOSE SERPL-MCNC: 144 MG/DL — HIGH (ref 70–99)
GLUCOSE UR QL: NEGATIVE MG/DL — SIGNIFICANT CHANGE UP
HCT VFR BLD CALC: 42.8 % — SIGNIFICANT CHANGE UP (ref 34.5–45)
HGB BLD-MCNC: 14.3 G/DL — SIGNIFICANT CHANGE UP (ref 11.5–15.5)
IMM GRANULOCYTES NFR BLD AUTO: 0.2 % — SIGNIFICANT CHANGE UP (ref 0–0.9)
KETONES UR-MCNC: NEGATIVE MG/DL — SIGNIFICANT CHANGE UP
LEUKOCYTE ESTERASE UR-ACNC: NEGATIVE — SIGNIFICANT CHANGE UP
LIDOCAIN IGE QN: 24 U/L — SIGNIFICANT CHANGE UP (ref 13–75)
LYMPHOCYTES # BLD AUTO: 1.35 K/UL — SIGNIFICANT CHANGE UP (ref 1–3.3)
LYMPHOCYTES # BLD AUTO: 13.8 % — SIGNIFICANT CHANGE UP (ref 13–44)
MCHC RBC-ENTMCNC: 30.6 PG — SIGNIFICANT CHANGE UP (ref 27–34)
MCHC RBC-ENTMCNC: 33.4 G/DL — SIGNIFICANT CHANGE UP (ref 32–36)
MCV RBC AUTO: 91.5 FL — SIGNIFICANT CHANGE UP (ref 80–100)
MONOCYTES # BLD AUTO: 0.88 K/UL — SIGNIFICANT CHANGE UP (ref 0–0.9)
MONOCYTES NFR BLD AUTO: 9 % — SIGNIFICANT CHANGE UP (ref 2–14)
NEUTROPHILS # BLD AUTO: 7.27 K/UL — SIGNIFICANT CHANGE UP (ref 1.8–7.4)
NEUTROPHILS NFR BLD AUTO: 74.6 % — SIGNIFICANT CHANGE UP (ref 43–77)
NITRITE UR-MCNC: NEGATIVE — SIGNIFICANT CHANGE UP
NRBC BLD AUTO-RTO: 0 /100 WBCS — SIGNIFICANT CHANGE UP (ref 0–0)
PH UR: 6.5 — SIGNIFICANT CHANGE UP (ref 5–8)
PLATELET # BLD AUTO: 165 K/UL — SIGNIFICANT CHANGE UP (ref 150–400)
POTASSIUM SERPL-MCNC: 3.8 MMOL/L — SIGNIFICANT CHANGE UP (ref 3.5–5.3)
POTASSIUM SERPL-SCNC: 3.8 MMOL/L — SIGNIFICANT CHANGE UP (ref 3.5–5.3)
PROT SERPL-MCNC: 7.5 G/DL — SIGNIFICANT CHANGE UP (ref 6–8.3)
PROT UR-MCNC: NEGATIVE MG/DL — SIGNIFICANT CHANGE UP
RBC # BLD: 4.68 M/UL — SIGNIFICANT CHANGE UP (ref 3.8–5.2)
RBC # FLD: 13.3 % — SIGNIFICANT CHANGE UP (ref 10.3–14.5)
RSV RNA NPH QL NAA+NON-PROBE: SIGNIFICANT CHANGE UP
SARS-COV-2 RNA SPEC QL NAA+PROBE: SIGNIFICANT CHANGE UP
SODIUM SERPL-SCNC: 140 MMOL/L — SIGNIFICANT CHANGE UP (ref 135–145)
SOURCE RESPIRATORY: SIGNIFICANT CHANGE UP
SP GR SPEC: 1 — SIGNIFICANT CHANGE UP (ref 1–1.03)
UROBILINOGEN FLD QL: 0.2 MG/DL — SIGNIFICANT CHANGE UP (ref 0.2–1)
WBC # BLD: 9.76 K/UL — SIGNIFICANT CHANGE UP (ref 3.8–10.5)
WBC # FLD AUTO: 9.76 K/UL — SIGNIFICANT CHANGE UP (ref 3.8–10.5)

## 2025-04-11 PROCEDURE — 81001 URINALYSIS AUTO W/SCOPE: CPT

## 2025-04-11 PROCEDURE — 96374 THER/PROPH/DIAG INJ IV PUSH: CPT

## 2025-04-11 PROCEDURE — 85025 COMPLETE CBC W/AUTO DIFF WBC: CPT

## 2025-04-11 PROCEDURE — 80053 COMPREHEN METABOLIC PANEL: CPT

## 2025-04-11 PROCEDURE — 99284 EMERGENCY DEPT VISIT MOD MDM: CPT

## 2025-04-11 PROCEDURE — 99284 EMERGENCY DEPT VISIT MOD MDM: CPT | Mod: 25

## 2025-04-11 PROCEDURE — 83690 ASSAY OF LIPASE: CPT

## 2025-04-11 PROCEDURE — 87637 SARSCOV2&INF A&B&RSV AMP PRB: CPT

## 2025-04-11 PROCEDURE — 36415 COLL VENOUS BLD VENIPUNCTURE: CPT

## 2025-04-11 RX ORDER — MAGNESIUM, ALUMINUM HYDROXIDE 200-200 MG
30 TABLET,CHEWABLE ORAL ONCE
Refills: 0 | Status: COMPLETED | OUTPATIENT
Start: 2025-04-11 | End: 2025-04-11

## 2025-04-11 RX ADMIN — Medication 30 MILLILITER(S): at 14:14

## 2025-04-11 RX ADMIN — Medication 1000 MILLILITER(S): at 14:15

## 2025-04-11 RX ADMIN — Medication 20 MILLIGRAM(S): at 14:14

## 2025-04-11 NOTE — ED PROVIDER NOTE - CLINICAL SUMMARY MEDICAL DECISION MAKING FREE TEXT BOX
74F with PMH Htn, hypothyroidism, presenting with 2-3 days of subjective fever, NB diarrhea, headache, and m/l abd pain. r/o pancreatitis, infectious causes    Plan:  -CBC, CMP  -Lipase  -UA  -Flu/COVID PCR  -IVF

## 2025-04-11 NOTE — ED PROVIDER NOTE - NS ED ROS FT
Eyes: No visual changes, eye pain or redness.  HEENT: No throat pain, hearing changes, ear pain, or nose bleeding.  CV: No chest pain, palpitations, leg swelling, syncope, or presyncope.  Resp: No shortness of breath, cough, or sputum production.  GI: per HPI  : No dysuria, hematuria, urinary frequency, or urinary urgency.  MSK: No muscle or joint pains.  Skin: No rash, lesions, bruises, or jaundice.  Neuro: No numbness, tingling, weakness, or dizziness.

## 2025-04-11 NOTE — ED PROVIDER NOTE - PROGRESS NOTE DETAILS
Labs unremarkable.  Patient reassessed and reports feeling better.  All results discussed with patient.  Will DC

## 2025-04-11 NOTE — ED PROVIDER NOTE - PATIENT PORTAL LINK FT
You can access the FollowMyHealth Patient Portal offered by [facility] by registering at the following website: [email or web address]. By joining [employer]’s FollowMyHealth portal, you will also be able to view your health information using other applications (apps) compatible with our system.

## 2025-04-11 NOTE — ED PROVIDER NOTE - ATTENDING CONTRIBUTION TO CARE
Agree with medical student aH&P.  73 y/o Female with PMH HTN, hypothyroidism, seasonal allergies? presenting with 2-3 days of subjective fever, diarrhea, headache, and abdominal  pain. Pt well appearing, with unremarkable exam.  Abdomen soft.  Likely viral illness / gastritis.  Low suspicion for pancreatitis, colitis, diverticulitis as exam unremarkable.  Will check labs, supportive care, and reassess.

## 2025-04-11 NOTE — ED PROVIDER NOTE - OBJECTIVE STATEMENT
74F with PMH Htn, hypothyroidism, presenting with 2-3 days of subjective fever, NB diarrhea, headache, and m/l abd pain. Patient initially started having sneezing and congestion 3 days ago, alleviated by loratidine. She then started having subjective fevers and 8/10 intermittent tension headaches for the last two days, alleviated by Tylenol. Patient also has had 2-3 episodes of NB diarrhea for the last 2 days.     Scheduled for colonoscopy in May. Denies sick contacts, recent travel, n/v, CP, SOB.

## 2025-04-11 NOTE — ED ADULT NURSE NOTE - CAS EDN DISCHARGE ASSESSMENT
9/11/2018  Heather Serna    PROCEDURE PERFORMED:   Colonoscopy with snare polypectomy.     INDICATION FOR PROCEDURE:   Colon cancer screening.     PROCEDURAL DIAGNOSES:   1. Ascending colon polyp 8 mm sessile removed by snare polypectomy  2. Sigmoid diverticulosis.   3. Internal hemorrhoids    MEDICATIONS:   as per Anesthesia    DESCRIPTION OF PROCEDURE:   After risks, benefits and alternatives of procedure, informed consent was obtained. Digital rectal exam was performed and did not reveal any significant abnormalities. The pediatric colonoscope was introduced to the anus and advanced through a good prep to the cecum. The prep was complete ([place] Bowel Preperation scale is greater than or equal to 6 with no individual segment score less than 2). The cecum was identified by ileocecal valve and appendiceal orifice. Instrument was withdrawn as the mucosa was examined. Withdrawal time was 8 minutes.      FINDINGS:   Ascending colon polyp 8 mm sessile removed by snare polypectomy. Cecum, transverse, descending and sigmoid colon were normal. There were scattered sigmoid diverticula. Retroflexion in the rectum revealed internal hemorrhoids. Scope was withdrawn, procedure was terminated.     COMPLICATIONS:   None.     ENDOSCOPIC IMPRESSION:   1. Colon polyp removed as above  2. Sigmoid diverticulosis   3. Internal hemorrhoids    RECOMMENDATION:   1. Follow up pathology.   2. High fiber diet, avoid constipation.   3. Repeat surveillance colonoscopy in 5 years depending on the pathology.     Thank you very much for allowing me to participate in the management of this patient.       
Alert and oriented to person, place and time

## 2025-04-11 NOTE — ED PROVIDER NOTE - PHYSICAL EXAMINATION
GENERAL: NAD, lying in bed comfortably  EYES: EOMI, PERRLA, conjunctiva and sclera clear  ENT: Moist mucous membranes  NECK: Supple, no JVD  HEART: S1, S2, Regular rate and rhythm, no murmurs, rubs, or gallops  LUNGS: Unlabored respirations, clear to auscultation bilaterally, no crackles, wheezes, or rhonchi  ABDOMEN: Soft, nondistended, very mild TTP in epigastric region  EXTREMITIES: 2+ peripheral pulses bilaterally. No clubbing, cyanosis, or edema  NERVOUS SYSTEM:  A&Ox3, no focal deficits   SKIN: No rashes or lesions

## 2025-04-11 NOTE — ED PROVIDER NOTE - NSFOLLOWUPINSTRUCTIONS_ED_ALL_ED_FT
Dolor abdominal    LO QUE NECESITA SABER:    ¿Qué necesito saber sobre el dolor abdominal?El dolor abdominal se puede sentir en cualquier lugar entre la parte final de las costillas y la kory. El dolor tom generalmente dura menos de 3 meses. El dolor crónico puede durar más de 3 meses. El dolor podría ser tom o sordo. El dolor puede permanecer en el mismo lugar o moverse alrededor. Podría tener dolor todo el tiempo, o aparecer y desaparecer. Dependiendo de la causa, también puede tener náuseas, vómitos, fiebre o diarrea.  Órganos abdominales    ¿Qué causa el dolor abdominal?Es probable que no se encuentre la causa. Las siguientes son las causas más comunes:    [place] en exceso, adam por gases o intoxicación alimentaria    Estreñimiento o diarrea    [place] lesión    Apendicitis, am[place] hernia o america úlcera    Infección u obstrucción    [place] afección del hígado, la vesícula biliar o los riñones  ¿Cómo se diagnostica la causa del dolor abdominal?El médico revisará corona abdomen. Le preguntará dónde tiene dolor y cuándo comenzó. Dígale si el dolor lo despierta o le impide realizar catalina actividades cotidianas. Describa las cosas que mejoran o empeoran el dolor. Es posible que también necesite alguno de los siguientes tratamientos:    Las muestras de benjamin, orina o evacuaciones intestinalespueden analizarse para detectar signos de america infección, enfermedad o lesión.    Imágenes de las radiografíasdel abdomen pueden mostrar america lesión u otra causa del dolor.  ¿Cómo se trata el dolor abdominal?    Puede administrarsepodrían administrarse. Pregunte al médico cómo debe maximo tatianna medicamento de forma khan. Algunos medicamentos recetados para el dolor contienen acetaminofén. No tome otros medicamentos que contengan acetaminofén sin consultarlo con corona médico. Demasiado acetaminofeno puede causar daño al hígado. Los medicamentos recetados para el dolor podrían causar estreñimiento. Pregunte a corona médico grace prevenir o tratar estreñimiento.    Los medicamentospueden administrarse para calmar corona estómago o prevenir los vómitos.    La terapia de relajaciónpuede utilizarse junto con los analgésicos.    La cirugíapuede ser necesaria, dependiendo de la causa.  ¿Qué puedo hacer para controlar o evitar el dolor abdominal?    Aplique calorsobre el abdomen de 20 a 30 minutos cada 2 horas por los días que le indiquen. El calor ayuda a disminuir el dolor y los espasmos musculares.    Realice cambios en los alimentos que consuma, de ser necesario.No coma alimentos que causan dolor abdominal u otros síntomas. Ingiera comidas pequeñas, más a menudo. Los siguientes cambios también pueden ayudar:  Coma más alimentos ricos en fibra si tiene estreñimiento.Los alimentos altos en fibra incluyen frutas, verduras, alimentos de grano integral y legumbres, grace frijoles pintos.        No coma alimentos que causan gas si tiene distensión.Por ejemplo, brócoli, repollo, frijoles y bebidas carbonatadas.    No consuma alimentos o bebidas que contienen sorbitol o fructosa si tiene diarrea y distensión.Algunos ejemplos son jugos de frutas, dulces, mermeladas y gomas de mascar sin azúcar.    No consuma alimentos altos en grasa.Por ejemplo, comidas fritas, hamburguesas con queso, perros calientes y postres.    Realice cambios en los líquidos que tome, de ser necesario.No tome líquidos que le causen dolor o lo empeoren, grace el jugo de naranja. [place] líquidos main el día para mantenerse hidratado. Los siguientes cambios también pueden ayudar:  Sugey suficientes líquidos para evitar la deshidratación causada por la diarrea o los vómitos.Pregunte a corona médico sobre la cantidad de líquido que necesita maximo todos los hermann y cuáles le recomienda.    Limite o no tome cafeína.La cafeína puede empeorar los síntomas, grace la acidez o las náuseas.    Limite o no consuma bebidas alcohólicas.El alcohol puede empeorar el dolor abdominal. Pregúntele a corona médico si está gideon que usted consuma alcohol. Pregunte qué cantidad puede beber. [place] bebida de alcohol equivale a 12 onzas de cerveza, ½ onza de licor o 5 onzas de vino.    Lleve un registro diario del dolor abdominal.Un diario puede ayudar a corona médico a saber lo que está causando corona dolor. Incluya cuándo ocurre el dolor, cuánto dura y la sensación causada por el dolor. Anote cualquier otro síntoma que tenga además del dolor abdominal. También anote lo que coma y cualquier síntoma que tenga después de comer.    Controle el estrés.El estrés puede causar dolor abdominal. Corona médico puede recomendarle técnicas de relajación y ejercicios de respiración profunda para ayudar a disminuir el estrés. Corona médico puede recomendarle que hable con alguien sobre corona estrés o ansiedad, grace un consejero o un amigo. [place] lo suficiente. Realice actividad física con regularidad.  Ga afrodescendiente caminando grace ejercicio      No fume.La nicotina y otros químicos en los cigarrillos pueden dañarle el esófago y el estómago. Pida información a corona médico si usted actualmente fuma y necesita ayuda para dejar de fumar. Los cigarrillos electrónicos o el tabaco sin humo igualmente contienen nicotina. Consulte con corona médico antes de utilizar estos productos.  Llame al número de emergencias local (911 en los [place]) si:    Usted tiene dolor en el pecho o falta de aire.    ¿Cuándo jami buscar atención inmediata?    Usted siente un dolor con pulsaciones en la parte superior del abdomen o en la parte inferior de la espalda que de repente se vuelve dewayne.    El dolor se localiza en la parte inferior derecha del abdomen y empeora cuando se mueve.    Usted tiene fiebre por encima de los 100.4 °F (38 °C) o escalofríos.    Usted tiene vómitos y no puede retener líquidos ni alimentos en el estómago.    El dolor no mejora o empeora en las próximas 8 a 12 horas.    Usted nota benjamin en corona vómito o heces, o éstas tienen un aspecto negruzco y alquitranado.    Corona piel o las partes al de catalina ojos se vuelven amarillentas.    Si usted es america lonnie y presenta abundante sangrado vaginal que no es corona menstruación.  ¿Cuándo jami llamar a mi médico?    Usted siente dolor en la parte inferior de la espalda.    Usted es varón y tiene dolor en los testículos.    Siente dolor al orinar.    Usted tiene preguntas o inquietudes acerca de corona condición o cuidado.  ACUERDOS SOBRE CORONA CUIDADO:    Usted tiene el derecho de ayudar a planear corona cuidado. Aprenda todo lo que pueda sobre corona condición y grace darle tratamiento. Discuta catalina opciones de tratamiento con catalina médicos para decidir el cuidado que usted desea recibir. Usted siempre tiene el derecho de rechazar el tratamiento.    © [employer] 1973, 2025    	  back to top

## 2025-04-12 PROBLEM — K80.20 CALCULUS OF GALLBLADDER WITHOUT CHOLECYSTITIS WITHOUT OBSTRUCTION: Chronic | Status: INACTIVE | Noted: 2023-11-17 | Resolved: 2025-04-11

## 2025-04-12 PROBLEM — Z78.9 OTHER SPECIFIED HEALTH STATUS: Chronic | Status: INACTIVE | Noted: 2023-11-17 | Resolved: 2025-04-11

## 2025-06-02 ENCOUNTER — APPOINTMENT (OUTPATIENT)
Dept: ULTRASOUND IMAGING | Facility: CLINIC | Age: 75
End: 2025-06-02

## 2025-06-02 ENCOUNTER — APPOINTMENT (OUTPATIENT)
Dept: MAMMOGRAPHY | Facility: CLINIC | Age: 75
End: 2025-06-02

## 2025-08-22 ENCOUNTER — APPOINTMENT (OUTPATIENT)
Dept: ULTRASOUND IMAGING | Facility: CLINIC | Age: 75
End: 2025-08-22

## 2025-08-22 ENCOUNTER — APPOINTMENT (OUTPATIENT)
Dept: MAMMOGRAPHY | Facility: CLINIC | Age: 75
End: 2025-08-22
Payer: MEDICARE

## 2025-08-22 PROCEDURE — 76641 ULTRASOUND BREAST COMPLETE: CPT | Mod: 50

## 2025-08-22 PROCEDURE — 77066 DX MAMMO INCL CAD BI: CPT

## 2025-08-22 PROCEDURE — G0279: CPT

## (undated) DEVICE — TUBING CONNECTING 6MM 20FT

## (undated) DEVICE — PACK ROBOTIC

## (undated) DEVICE — D HELP - CLEARVIEW CLEARIFY SYSTEM

## (undated) DEVICE — SUT MONOCRYL 4-0 27" PS-2 UNDYED

## (undated) DEVICE — XI SEAL UNIV 5- 8 MM

## (undated) DEVICE — XI DRAPE ARM

## (undated) DEVICE — TROCAR SURGIQUEST AIRSEAL 12MMX100MM

## (undated) DEVICE — ELCTR BOVIE BLADE 3/4" EXTENDED LENGTH 6"

## (undated) DEVICE — XI ARM PERMANENT CAUTERY HOOK

## (undated) DEVICE — XI ARM SCISSOR ROUND TIP 8MM

## (undated) DEVICE — XI DRAPE COLUMN

## (undated) DEVICE — XI ARM CLIP APPLIER LARGE

## (undated) DEVICE — DRAPE TOWEL BLUE STICKY

## (undated) DEVICE — CLEANER FOR ELCTR TIP

## (undated) DEVICE — Device

## (undated) DEVICE — XI OBTURATOR OPTICAL BLADELESS 8MM

## (undated) DEVICE — ENDOCATCH 10MM SPECIMEN POUCH

## (undated) DEVICE — XI ARM FORCEP PROGRASP 8MM

## (undated) DEVICE — TUBING STRYKEFLOW II SUCTION / IRRIGATOR

## (undated) DEVICE — SUT POLYSORB 0 30" GU-46

## (undated) DEVICE — SUT VICRYL 0 27" CT-2 UNDYED

## (undated) DEVICE — DRAPE XL SHEET 77X98"